# Patient Record
Sex: FEMALE | Race: WHITE | Employment: UNEMPLOYED | URBAN - METROPOLITAN AREA
[De-identification: names, ages, dates, MRNs, and addresses within clinical notes are randomized per-mention and may not be internally consistent; named-entity substitution may affect disease eponyms.]

---

## 2023-03-08 PROBLEM — O99.212 OBESITY COMPLICATING PREGNANCY, SECOND TRIMESTER: Status: ACTIVE | Noted: 2023-03-08

## 2023-03-08 PROBLEM — Z87.59 HISTORY OF GESTATIONAL HYPERTENSION: Status: ACTIVE | Noted: 2023-03-08

## 2023-03-08 PROBLEM — Z34.82 MULTIGRAVIDA IN SECOND TRIMESTER: Status: ACTIVE | Noted: 2023-03-08

## 2023-04-03 DIAGNOSIS — Z34.82 MULTIGRAVIDA IN SECOND TRIMESTER: ICD-10-CM

## 2023-04-03 LAB
ABO + RH BLD: NORMAL
BLOOD GROUP ANTIBODIES SERPL: NORMAL
ERYTHROCYTE [DISTWIDTH] IN BLOOD BY AUTOMATED COUNT: 15.6 % (ref 11.9–14.6)
EST. AVERAGE GLUCOSE BLD GHB EST-MCNC: 100 MG/DL
HBA1C MFR BLD: 5.1 % (ref 4.8–5.6)
HCT VFR BLD AUTO: 36.4 % (ref 35.8–46.3)
HGB BLD-MCNC: 11.8 G/DL (ref 11.7–15.4)
MCH RBC QN AUTO: 25.4 PG (ref 26.1–32.9)
MCHC RBC AUTO-ENTMCNC: 32.4 G/DL (ref 31.4–35)
MCV RBC AUTO: 78.4 FL (ref 82–102)
NRBC # BLD: 0 K/UL (ref 0–0.2)
PLATELET # BLD AUTO: 318 K/UL (ref 150–450)
PMV BLD AUTO: 11.4 FL (ref 9.4–12.3)
RBC # BLD AUTO: 4.64 M/UL (ref 4.05–5.2)
WBC # BLD AUTO: 10.1 K/UL (ref 4.3–11.1)

## 2023-04-04 LAB
HBV SURFACE AG SER QL: NONREACTIVE
HCV AB SER QL: NONREACTIVE
HIV 1+2 AB+HIV1 P24 AG SERPL QL IA: NONREACTIVE
HIV 1/2 RESULT COMMENT: NORMAL
RPR SER QL: NONREACTIVE
RUBV IGG SERPL IA-ACNC: 90.5 IU/ML

## 2023-04-05 LAB
HGB A MFR BLD: 97.2 % (ref 96.4–98.8)
HGB A2 MFR BLD COLUMN CHROM: 2.8 % (ref 1.8–3.2)
HGB F MFR BLD: 0 % (ref 0–2)
HGB FRACT BLD-IMP: NORMAL
HGB S MFR BLD: 0 %

## 2023-04-17 ENCOUNTER — ROUTINE PRENATAL (OUTPATIENT)
Dept: OBGYN CLINIC | Age: 23
End: 2023-04-17
Payer: MEDICAID

## 2023-04-17 VITALS — SYSTOLIC BLOOD PRESSURE: 100 MMHG | DIASTOLIC BLOOD PRESSURE: 67 MMHG | HEART RATE: 81 BPM

## 2023-04-17 DIAGNOSIS — Z34.82 MULTIGRAVIDA IN SECOND TRIMESTER: ICD-10-CM

## 2023-04-17 DIAGNOSIS — O99.342 MENTAL DISORDER AFFECTING PREGNANCY IN SECOND TRIMESTER: ICD-10-CM

## 2023-04-17 DIAGNOSIS — Z87.59 HISTORY OF GESTATIONAL HYPERTENSION: ICD-10-CM

## 2023-04-17 DIAGNOSIS — O09.522 HIGH RISK PREGNANCY, MULTIGRAVIDA OF ADVANCED MATERNAL AGE IN SECOND TRIMESTER: ICD-10-CM

## 2023-04-17 DIAGNOSIS — Z3A.22 22 WEEKS GESTATION OF PREGNANCY: ICD-10-CM

## 2023-04-17 DIAGNOSIS — O99.212 OBESITY COMPLICATING PREGNANCY, SECOND TRIMESTER: Primary | ICD-10-CM

## 2023-04-17 PROCEDURE — 96127 BRIEF EMOTIONAL/BEHAV ASSMT: CPT | Performed by: OBSTETRICS & GYNECOLOGY

## 2023-04-17 PROCEDURE — 76811 OB US DETAILED SNGL FETUS: CPT | Performed by: OBSTETRICS & GYNECOLOGY

## 2023-04-17 PROCEDURE — 76825 ECHO EXAM OF FETAL HEART: CPT | Performed by: OBSTETRICS & GYNECOLOGY

## 2023-04-17 PROCEDURE — 93325 DOPPLER ECHO COLOR FLOW MAPG: CPT | Performed by: OBSTETRICS & GYNECOLOGY

## 2023-04-17 PROCEDURE — 99205 OFFICE O/P NEW HI 60 MIN: CPT | Performed by: OBSTETRICS & GYNECOLOGY

## 2023-04-17 ASSESSMENT — PATIENT HEALTH QUESTIONNAIRE - PHQ9
SUM OF ALL RESPONSES TO PHQ QUESTIONS 1-9: 1
SUM OF ALL RESPONSES TO PHQ9 QUESTIONS 1 & 2: 1
2. FEELING DOWN, DEPRESSED OR HOPELESS: 1
1. LITTLE INTEREST OR PLEASURE IN DOING THINGS: 0
SUM OF ALL RESPONSES TO PHQ QUESTIONS 1-9: 1

## 2023-04-17 NOTE — ASSESSMENT & PLAN NOTE
F/U 4-6 weeks growth and repeat echo    Genetic counseling was performed by physician after reviewing patient's genetic history. The patient's Down syndrome age associated risk, as well as, risks of additional aneuploidy and genetic syndromes, are reduced by approximately 50% with a normal anatomy ultrasound. Ultrasound alone does not rule out all abnormalities of genetics and development. Maternal serum screening for aneuploidy was discussed with the patient including first trimester EZEKIEL-A/hCG, second trimester Quad screen (either in isolation or sequential with EZEKIEL-A) as well as non-invasive prenatal testing (NIPT) for aneuploidy from a maternal blood sample. Positive predictive and negative predictive values for these tests were explained, questions answered. Patient understands that these are screening tests that only assesses risk for select abnormalities (trisomies 15, 25, and 21, and sex chromosome abnormalities (NIPT), as well as markers for placental health (EZEKIEL-A) and risk for open neural tube defects (quad)). NIPT is designed for high risk populations, but should be considered by all patients who desire the current best option for screening for applicable genetic abnormalities. Limitations of technology discussed based on maternal age, technical aspects of tests, and maternal BMI reviewed. All questions answered and concerns discussed. Patient elected to proceed with Ultrasound only with no serum screening.

## 2023-04-17 NOTE — ASSESSMENT & PLAN NOTE
Longterm impact of hypertension in pregnancy    Discussed with patient the long-term and recurrence risks associated with hypertensive disorder of pregnancy. With a history of hypertensive disease in pregnancy, patient has increased risk of cardiovascular and cerebrovascular events in the future. A recent metaanalysis by Camilo Peterson al. demonstrated that over a mean follow-up of 14.1 years, the relative risk of developing chronic hypertension in those with a history of preeclampsia was 3.7. In addition, it predisposes to microalbuminuria and long lasting renal disease. Carefully chosen antihypertensives can lower the risk for both kidney and cardiac events among those with hypertension. Patient counseled to let her PCP know about this history. All women with history of hypertensive disorders of pregnancy should maintain a healthy weight, stay active, avoid tobacco use, and be evaluated regularly for cardiovascular disease. Her risk of recurrence (of any Hypertensive Disorder of Pregnancy) is approximately 25-40%. Recommend low dose Aspirin x 2 tablets (162 mg daily) is recommended to be started by 12-16 weeks; some benefit seen with starting up to 28 weeks. Recommend at least 12 months between pregnancies for future children. Preeclampsia. org for information and preeclampsia registry.

## 2023-04-17 NOTE — ASSESSMENT & PLAN NOTE
Obesity in Pregnancy  Preconception BMI ? 30 increases risk for pregnancy complications, including gestational diabetes, poor or accelerated fetal growth, hypertensive disorders of pregnancy, and abnormal labor progression. In addition, there is an increased risk of fetal demise, as well as congenital anomalies including neural tube defects, cardiac malformations, orofacial defects, and limb reduction abnormalities. The risk for stillbirth increases with increasing obesity: class I obesity 1.3 [1.2-1.4], class II obesity 1.4 [1.3-1.6], class III obesity 1.9 [1.3-1.6]) and higher stillbirth risk in  obese women (1.9 [1.7-2.1]) than in  obese women (1.4 [1.3-1.5]). Among women with class III obesity (BMI ?40 kg/m2), the risk for stillbirth increased with advancing gestational age: 27 to 29 weeks, hazard and risk ratios 1.40 and 1.69, respectively; 37 to 39 weeks, hazard and risk ratios 3.20 and 2.95, respectively; and 40 to 42 weeks, hazard and risk ratios 3.30 and 8.95, respectively. · Recommend level II ultrasound for anatomy and fetal echo if prepregnancy BMI >30-35 based on AIUM guidelines. · Consider  testing beginning at 32-36 weeks due to risks of fetal demise, timing dependent on maternal and fetal comorbidities. · Early evaluation of insulin resistance with HgbA1c at initiation of care- if a1c > 5.5, then follow up with either \"2 step\" 1hr GCT/ 3hr GTT OR \"1 step\" 2hr GTT  · Closely monitor blood pressure for development/worsening of hypertensive disorders of pregnancy. · Weight Gain Goal: <15 pounds, it is ok to stay same weight or lose as long as baby growing well  · Dietary choices- low carb fine, avoid extreme keto (goal >50-75gm carb/day); not to use intermittent/prolonged fasting without specific discussion with physician.    · Continue activity/exercise     Patient counseled re need to optimize both maternal and fetal health by remaining active, limiting

## 2023-04-17 NOTE — PROGRESS NOTES
avoid extreme keto (goal >50-75gm carb/day); not to use intermittent/prolonged fasting without specific discussion with physician. Continue activity/exercise     Patient counseled re need to optimize both maternal and fetal health by remaining active, limiting weight gain, and modifying food choices. She understands that if obesity worsens, then will need to meet with anesthesia and as a team determine safest location for delivery. [address]  Guidelines for the Management of Obese Pregnant Patients  Patients with a Pre-pregnancy or First Trimester BMI ? 50 or ?500 pounds OR who have BMI? 39 with comorbidities should be referred for prenatal care and delivery to a group that delivers at a level 3 center [facility], [facility], [facility]). Patients who reach a BMI ? 50 or 500 pounds during pregnancy should be:   Referred to the Anesthesia Pre-Assessment area for consultation. The Anesthesia department will determine if patient needs any further evaluation and can safely receive anesthesia care at [facility].  Anesthesiologist may refer patient for further evaluation including Maternal Echo and/or Obstructive Sleep Apnea evaluation  Referred back to [facility] for consultation if they are not already following patient. Primary OB will then contact [facility] to discuss Anesthesia evaluation and recommendations and decide whether patient can be safely delivered at [facility] or needs transfer to an OB/Gyn group to be delivered at level 3 center. Mood evaluated today- Mood concerns: well; PHQ2 reviewed. Counseling re possibility of peripartum worsening. As mood stable and depression/anxiety well-controlled, will not change medications today.       Addressed normal pregnancy complaints, reassured and offered suggestions for care  Reviewed gestational age precautions and activity goals/limitations  Nutritional counseling as well as specific

## 2023-04-18 NOTE — ASSESSMENT & PLAN NOTE
Anxiety and Depression  The approach to depression and anxiety in pregnancy must look at the whole maternal-child cohort to assess risks and benefits.  depression is associated with an increased risk of multiple poor obstetrical outcomes, including spontaneous , bleeding, operative deliveries, and  birth. In a nationally representative survey in the [place] that identified pregnant women with major depression, only 50 percent received treatment. Untreated disease causes maternal suffering and is associated with poor nutrition, comorbid substance use disorders, poor adherence with prenatal care, postpartum depression, impaired relationships between the mother and her infant and other family members, and an increased risk of suicide. It is important to assess the benefit of previous treatment in order to guide treatment selection.  If psychotherapy is indicated and the patient was successfully treated with a particular psychotherapy prior to pregnancy, the same therapy is used during pregnancy.  Similarly, if pharmacotherapy is indicated and the patient was successfully treated with a particular antidepressant prior to pregnancy, the same drug is used during pregnancy.  The risks of untreated moderate to severe maternal major depression, to both the mother and fetus, often outweigh the risks associated with antidepressants. A national registry study (nearly 1,300,000 births) compared infants who were exposed to SSRIs in early pregnancy (n >10,000) with infants not exposed. After adjusting for potential confounds (eg, maternal age, smoking, and body mass index), the analyses found that the risk of severe congenital malformations was comparable in the two groups. Antidepressant drug doses may need to be increased as the pregnancy progresses, especially during the third trimester.  There is no evidence that tapering or discontinuing antidepressants at term

## 2023-05-16 ENCOUNTER — ROUTINE PRENATAL (OUTPATIENT)
Dept: OBGYN CLINIC | Age: 23
End: 2023-05-16
Payer: MEDICAID

## 2023-05-16 VITALS
HEART RATE: 88 BPM | OXYGEN SATURATION: 97 % | WEIGHT: 272 LBS | SYSTOLIC BLOOD PRESSURE: 122 MMHG | HEIGHT: 63 IN | BODY MASS INDEX: 48.2 KG/M2 | DIASTOLIC BLOOD PRESSURE: 68 MMHG

## 2023-05-16 DIAGNOSIS — O99.213 OBESITY AFFECTING PREGNANCY IN THIRD TRIMESTER: ICD-10-CM

## 2023-05-16 DIAGNOSIS — Z87.59 HISTORY OF GESTATIONAL HYPERTENSION: ICD-10-CM

## 2023-05-16 DIAGNOSIS — Z34.83 MULTIGRAVIDA IN THIRD TRIMESTER: ICD-10-CM

## 2023-05-16 DIAGNOSIS — O99.343 MENTAL DISORDER AFFECTING PREGNANCY IN THIRD TRIMESTER: ICD-10-CM

## 2023-05-16 PROCEDURE — 99213 OFFICE O/P EST LOW 20 MIN: CPT | Performed by: OBSTETRICS & GYNECOLOGY

## 2023-05-16 ASSESSMENT — PATIENT HEALTH QUESTIONNAIRE - PHQ9
1. LITTLE INTEREST OR PLEASURE IN DOING THINGS: 1
SUM OF ALL RESPONSES TO PHQ QUESTIONS 1-9: 2
SUM OF ALL RESPONSES TO PHQ9 QUESTIONS 1 & 2: 2
SUM OF ALL RESPONSES TO PHQ QUESTIONS 1-9: 2
2. FEELING DOWN, DEPRESSED OR HOPELESS: 1
SUM OF ALL RESPONSES TO PHQ QUESTIONS 1-9: 2
SUM OF ALL RESPONSES TO PHQ QUESTIONS 1-9: 2

## 2023-05-16 NOTE — PROGRESS NOTES
Chief Complaint   Patient presents with    Routine Prenatal Visit        This 20 y.o. Delia Martinez at 26w2d with Estimated Date of Delivery: 23 presents for routine prenatal visit. Patient has some complaints today. Pt reports good FM, no LOF, VB, ctx. Pt denies H/A, vision changes, abdom pain, N/V.  (+) intermittent dizziness - pt admits to drinking no water. Encouraged small frequent meals and significant increase in water intake    Vitals:    23 1126   BP: 122/68   Site: Left Upper Arm   Position: Sitting   Pulse: 88   SpO2: 97%   Weight: 272 lb (123.4 kg)   Height: 5' 3\" (1.6 m)        Patient Active Problem List    Diagnosis Date Noted    Multigravida in third trimester 2023     Priority: Medium     Overview Note:     EDC by 16 3/7 week US not C/W LMP    3/8/23: declines NIPT/AFP  2023 at [facility]: Normal anatomy/echo limited by fetal position/MBH, JHONATAN WNL, AC 44%; Declined genetic testing. Assessment & Plan Note:     Educated patient of signs and symptoms of  labor including but not limited to regular uterine contractions every 5-7 minutes for 1 hour, vaginal bleeding or leakage of fluid to seek immediate care.  Obesity affecting pregnancy in third trimester 2023     Priority: Medium     Overview Note:     PLAN: Hgb A1C (wnl, 5.0), early (wnl)/late glucola, serial growth US, anesthesia consult in 3rd trimester and MFM referral    23 [facility]: () 1 hour glucola = 83         Assessment & Plan Note:     noted      Mental disorder affecting pregnancy in third trimester 2023     Priority: Medium     Overview Note:     Hx depression, anxiety. On lexapro and trazadone prepreg  23 [facility]: Reports stable mood on Lexapro and Trazadone.        Assessment & Plan Note:     Stable on current regimen      History of gestational hypertension 2023     Priority: Medium     Overview Note:     PLAN: baby ASA 12-36 weeks, close obs BP throughout preg  23 [facility]:

## 2023-05-16 NOTE — PROGRESS NOTES
Patient comes in today for routine prenatal visit. Patient states that she is still experiencing \"dizzy spells\" 1-2 weeks. Patient confirms she mainly drinks sweet tea and coffee, educated patient the importance of water intake during pregnancy. Denies chest pain and SOB.      Fetal Movement: Yes  Contractions: No  Vaginal Bleeding: No  Leaking Fluid: No  GI/: Yes-patient needs refill on zofran     Vitals:    05/16/23 1126   BP: 122/68   Site: Left Upper Arm   Position: Sitting   Pulse: 88   SpO2: 97%   Weight: 272 lb (123.4 kg)   Height: 5' 3\" (1.6 m)

## 2023-05-16 NOTE — PATIENT INSTRUCTIONS
Please increase the amount of water you are drinking daily  If you develop signs and symptoms of  labor including but not limited to regular uterine contractions every 5-7 minutes for 1 hour, vaginal bleeding or leakage of fluid please contact our office and/or seek immediate care. Thanks for coming to see us today and letting us take care of you!

## 2023-05-30 ENCOUNTER — ROUTINE PRENATAL (OUTPATIENT)
Dept: OBGYN CLINIC | Age: 23
End: 2023-05-30
Payer: MEDICAID

## 2023-05-30 VITALS — SYSTOLIC BLOOD PRESSURE: 118 MMHG | DIASTOLIC BLOOD PRESSURE: 60 MMHG | BODY MASS INDEX: 48.18 KG/M2 | HEIGHT: 63 IN

## 2023-05-30 DIAGNOSIS — Z34.83 MULTIGRAVIDA IN THIRD TRIMESTER: Primary | ICD-10-CM

## 2023-05-30 DIAGNOSIS — O99.343 MENTAL DISORDER AFFECTING PREGNANCY IN THIRD TRIMESTER: ICD-10-CM

## 2023-05-30 DIAGNOSIS — O99.213 OBESITY AFFECTING PREGNANCY IN THIRD TRIMESTER: ICD-10-CM

## 2023-05-30 DIAGNOSIS — Z34.83 MULTIGRAVIDA IN THIRD TRIMESTER: ICD-10-CM

## 2023-05-30 DIAGNOSIS — Z87.59 HISTORY OF GESTATIONAL HYPERTENSION: ICD-10-CM

## 2023-05-30 LAB
BASOPHILS # BLD: 0 K/UL (ref 0–0.2)
BASOPHILS NFR BLD: 0 % (ref 0–2)
DIFFERENTIAL METHOD BLD: ABNORMAL
EOSINOPHIL # BLD: 0.1 K/UL (ref 0–0.8)
EOSINOPHIL NFR BLD: 1 % (ref 0.5–7.8)
ERYTHROCYTE [DISTWIDTH] IN BLOOD BY AUTOMATED COUNT: 15.5 % (ref 11.9–14.6)
GLUCOSE 1 HOUR: 92 MG/DL
HCT VFR BLD AUTO: 34 % (ref 35.8–46.3)
HGB BLD-MCNC: 10.8 G/DL (ref 11.7–15.4)
IMM GRANULOCYTES # BLD AUTO: 0.1 K/UL (ref 0–0.5)
IMM GRANULOCYTES NFR BLD AUTO: 1 % (ref 0–5)
LYMPHOCYTES # BLD: 2.3 K/UL (ref 0.5–4.6)
LYMPHOCYTES NFR BLD: 26 % (ref 13–44)
MCH RBC QN AUTO: 26.1 PG (ref 26.1–32.9)
MCHC RBC AUTO-ENTMCNC: 31.8 G/DL (ref 31.4–35)
MCV RBC AUTO: 82.1 FL (ref 82–102)
MONOCYTES # BLD: 0.7 K/UL (ref 0.1–1.3)
MONOCYTES NFR BLD: 8 % (ref 4–12)
NEUTS SEG # BLD: 5.7 K/UL (ref 1.7–8.2)
NEUTS SEG NFR BLD: 64 % (ref 43–78)
NRBC # BLD: 0 K/UL (ref 0–0.2)
PLATELET # BLD AUTO: 297 K/UL (ref 150–450)
PMV BLD AUTO: 11.6 FL (ref 9.4–12.3)
RBC # BLD AUTO: 4.14 M/UL (ref 4.05–5.2)
WBC # BLD AUTO: 8.9 K/UL (ref 4.3–11.1)

## 2023-05-30 PROCEDURE — 99213 OFFICE O/P EST LOW 20 MIN: CPT | Performed by: OBSTETRICS & GYNECOLOGY

## 2023-05-30 ASSESSMENT — PATIENT HEALTH QUESTIONNAIRE - PHQ9
SUM OF ALL RESPONSES TO PHQ QUESTIONS 1-9: 1
1. LITTLE INTEREST OR PLEASURE IN DOING THINGS: 0
SUM OF ALL RESPONSES TO PHQ9 QUESTIONS 1 & 2: 1
2. FEELING DOWN, DEPRESSED OR HOPELESS: 1
SUM OF ALL RESPONSES TO PHQ QUESTIONS 1-9: 1

## 2023-05-30 NOTE — PROGRESS NOTES
Patient comes in today for routine prenatal visit. No complaints/concerns today.      Finished Glucola @ 11:12  am    Fetal Movement: Yes  Contractions: No  Vaginal Bleeding: No  Leaking Fluid: No  GI/: No    Vitals:    05/30/23 1101   BP: 118/60   Site: Left Upper Arm   Position: Sitting   Height: 5' 3\" (1.6 m)

## 2023-05-30 NOTE — ASSESSMENT & PLAN NOTE
BP wnl again today  Educated patient on signs and symptoms of preeclampsia including but not limited to: elevated blood pressure, increased swelling, persistent headache, visual changes, nausea/vomiting & right upper quadrant pain. Patient to notify us and/or seek immediate care. Cooperative/Alert/Awake

## 2023-05-30 NOTE — PROGRESS NOTES
Chief Complaint   Patient presents with    Routine Prenatal Visit        This 20 y.o. Jose Miguel Felder at 28w2d with Estimated Date of Delivery: 23 presents for routine prenatal visit. Patient has no complaints today. Pt reports good FM, no LOF, VB, ctx. Pt denies H/A, vision changes, abdom pain, N/V. Vitals:    23 1101   BP: 118/60   Site: Left Upper Arm   Position: Sitting   Height: 5' 3\" (1.6 m)        Patient Active Problem List    Diagnosis Date Noted    Multigravida in third trimester 2023     Priority: Medium     Overview Note:     EDC by 16 3/7 week US not C/W LMP    3/8/23: declines NIPT/AFP  2023 at [facility]: Normal anatomy/echo limited by fetal position/MBH, JHONATAN WNL, AC 44%; Declined genetic testing. Assessment & Plan Note:     Educated patient of signs and symptoms of  labor including but not limited to regular uterine contractions every 5-7 minutes for 1 hour, vaginal bleeding or leakage of fluid to seek immediate care.  Obesity affecting pregnancy in third trimester 2023     Priority: Medium     Overview Note:     PLAN: Hgb A1C (wnl, 5.0), early (wnl)/late glucola, serial growth US, anesthesia consult in 3rd trimester and MFM referral    23 [facility]: () 1 hour glucola = 83         Assessment & Plan Note:     noted      Mental disorder affecting pregnancy in third trimester 2023     Priority: Medium     Overview Note:     Hx depression, anxiety. On lexapro and trazadone prepreg  23 [facility]: Reports stable mood on Lexapro and Trazadone. Assessment & Plan Note:     stable on current regimen      History of gestational hypertension 2023     Priority: Medium     Overview Note:     PLAN: baby ASA 12-36 weeks, close obs BP throughout preg  23 [facility]: 2wk pp had severe HTN. None otherwise.    Get baseline preeclamptic labs in your office at patient's next OB visit (CBC, CMP, LDH, Uric Acid); also do P/C ratio (if elevated, need to then

## 2023-05-31 ENCOUNTER — ROUTINE PRENATAL (OUTPATIENT)
Dept: OBGYN CLINIC | Age: 23
End: 2023-05-31

## 2023-05-31 VITALS — DIASTOLIC BLOOD PRESSURE: 56 MMHG | SYSTOLIC BLOOD PRESSURE: 109 MMHG | HEART RATE: 82 BPM

## 2023-05-31 DIAGNOSIS — O99.343 MENTAL DISORDER AFFECTING PREGNANCY IN THIRD TRIMESTER: ICD-10-CM

## 2023-05-31 DIAGNOSIS — O99.013 ANEMIA DURING PREGNANCY IN THIRD TRIMESTER: ICD-10-CM

## 2023-05-31 DIAGNOSIS — Z87.59 HISTORY OF GESTATIONAL HYPERTENSION: ICD-10-CM

## 2023-05-31 DIAGNOSIS — O99.213 OBESITY AFFECTING PREGNANCY IN THIRD TRIMESTER: ICD-10-CM

## 2023-05-31 DIAGNOSIS — Z3A.28 28 WEEKS GESTATION OF PREGNANCY: ICD-10-CM

## 2023-05-31 DIAGNOSIS — M84.475S METATARSAL FRACTURE, PATHOLOGIC, LEFT, SEQUELA: ICD-10-CM

## 2023-05-31 DIAGNOSIS — Z34.83 MULTIGRAVIDA IN THIRD TRIMESTER: Primary | ICD-10-CM

## 2023-05-31 RX ORDER — DOCUSATE SODIUM 100 MG/1
100 CAPSULE, LIQUID FILLED ORAL 2 TIMES DAILY
Qty: 60 CAPSULE | Refills: 5 | Status: SHIPPED | OUTPATIENT
Start: 2023-05-31 | End: 2023-06-30

## 2023-05-31 RX ORDER — FERROUS SULFATE 325(65) MG
325 TABLET ORAL 2 TIMES DAILY
Qty: 60 TABLET | Refills: 5 | Status: SHIPPED | OUTPATIENT
Start: 2023-05-31

## 2023-05-31 ASSESSMENT — PATIENT HEALTH QUESTIONNAIRE - PHQ9
SUM OF ALL RESPONSES TO PHQ QUESTIONS 1-9: 4
2. FEELING DOWN, DEPRESSED OR HOPELESS: 2
SUM OF ALL RESPONSES TO PHQ QUESTIONS 1-9: 4
SUM OF ALL RESPONSES TO PHQ9 QUESTIONS 1 & 2: 4
SUM OF ALL RESPONSES TO PHQ QUESTIONS 1-9: 4
1. LITTLE INTEREST OR PLEASURE IN DOING THINGS: 2
SUM OF ALL RESPONSES TO PHQ QUESTIONS 1-9: 4

## 2023-05-31 NOTE — PROGRESS NOTES
[facility] [place] Follow-up Visit    Nataliia Cormier (: 2000) is a 20 y.o. Charolet Downer at 28w3d with 2023, by Ultrasound. Presents for evaluation of the following chief complaint(s):  High Risk Pregnancy (BMI >45, Anxiety and Depression, & Hx GHTN, H/O PP PreE)     Patient is not working outside of home. Scheduled to see Primary OB (MICHAELO/Arya) on 23. Reports occasional HA's. Has edema in BLE, left foot is in a boot (has fractured left foot). Denies preeclamptic symptoms. Reports good fetal movement. No bleeding, LOF, cramping, ctxs, or vaginal pressure. Reviewed history of prior pregnancy and postpartum events. Pt with PP preE diagnosis. She originally denied this, but I reviewed this in [facility] with her. At that point she realized that she had had this issue. Of note, her brother  soon after this and she never went to follow up visits. Will need to be monitored closely. Interval history since prior appt reviewed and updated as indicated. Review of Systems - per HPI; otherwise unremarkable. Exam:     Vitals:    23 1507   BP: (!) 109/56   Pulse: 82     Recent Labs Reviewed. Please see formal ultrasound report under imaging tab. ASSESSMENT/PLAN:  Patient Active Problem List    Diagnosis Date Noted    Multigravida in third trimester 2023     Priority: High     Overview Note:     EDC by 16 3/7 week US not C/W LMP    3/8/23: declines NIPT/AFP  2023 at [facility]: Normal anatomy/echo limited by fetal position/MBH, JHONATAN WNL, AC 44%; Declined genetic testing. 23 [facility]: Appropriate fetal growth; Normal repeat echo; AC 40%, Overall 47%, JHONATAN 17.1cm, Dopplers WNL. Reassuring fetal status. Assessment & Plan Note:     No f/u with [facility] needed at this time; return PRN. Metatarsal fracture, pathologic, left, sequela 2023     Priority: Medium     Overview Note:     Rolled foot last week; nondisplaced fracture. Can't take NSAIDs.    Gave #20 5mg

## 2023-05-31 NOTE — TELEPHONE ENCOUNTER
Patient informed of results and recommendations. Rx pend to be sent. Patient voiced understanding.  so

## 2023-05-31 NOTE — TELEPHONE ENCOUNTER
----- Message from Jose Luis Landeros MD sent at 5/31/2023  8:19 AM EDT -----  Please notify patient of anemia - needs Fe PO BID added to PNV. Also needs Colace BID. Can take OTC or have Rx.

## 2023-06-01 NOTE — ASSESSMENT & PLAN NOTE
Rolled foot last week; nondisplaced fracture. Can't take NSAIDs. Gave #20 5mg roxicodone, 0 refills. Sees ortho next week.

## 2023-06-14 PROBLEM — J45.20 MILD INTERMITTENT ASTHMA WITHOUT COMPLICATION: Status: ACTIVE | Noted: 2021-04-26

## 2023-06-14 PROBLEM — F41.1 GENERALIZED ANXIETY DISORDER: Status: ACTIVE | Noted: 2021-09-23

## 2023-06-14 PROBLEM — O99.810 ABNORMAL GLUCOSE TOLERANCE TEST IN PREGNANCY, ANTEPARTUM: Status: ACTIVE | Noted: 2021-11-22

## 2023-06-14 PROBLEM — O13.9 GESTATIONAL HYPERTENSION WITHOUT SIGNIFICANT PROTEINURIA, ANTEPARTUM: Status: ACTIVE | Noted: 2021-12-03

## 2023-06-14 PROBLEM — F32.A DEPRESSIVE DISORDER: Status: ACTIVE | Noted: 2023-06-14

## 2023-06-14 PROBLEM — F33.2 MAJOR DEPRESSIVE DISORDER, RECURRENT EPISODE, SEVERE (HCC): Status: ACTIVE | Noted: 2021-04-26

## 2023-06-14 PROBLEM — O23.43 UTI (URINARY TRACT INFECTION) DURING PREGNANCY, THIRD TRIMESTER: Status: ACTIVE | Noted: 2021-04-19

## 2023-06-14 PROBLEM — R51.9 HEADACHE: Status: ACTIVE | Noted: 2021-12-12

## 2023-06-27 ENCOUNTER — ROUTINE PRENATAL (OUTPATIENT)
Dept: OBGYN CLINIC | Age: 23
End: 2023-06-27
Payer: MEDICAID

## 2023-06-27 VITALS
HEIGHT: 63 IN | SYSTOLIC BLOOD PRESSURE: 116 MMHG | DIASTOLIC BLOOD PRESSURE: 68 MMHG | WEIGHT: 270 LBS | BODY MASS INDEX: 47.84 KG/M2

## 2023-06-27 DIAGNOSIS — O99.343 MENTAL DISORDER AFFECTING PREGNANCY IN THIRD TRIMESTER: ICD-10-CM

## 2023-06-27 DIAGNOSIS — Z87.59 HISTORY OF GESTATIONAL HYPERTENSION: ICD-10-CM

## 2023-06-27 DIAGNOSIS — O26.843 SIGNIFICANT DISCREPANCY BETWEEN UTERINE SIZE AND CLINICAL DATES, ANTEPARTUM, THIRD TRIMESTER: Primary | ICD-10-CM

## 2023-06-27 DIAGNOSIS — Z34.83 MULTIGRAVIDA IN THIRD TRIMESTER: ICD-10-CM

## 2023-06-27 DIAGNOSIS — O99.213 OBESITY AFFECTING PREGNANCY IN THIRD TRIMESTER: ICD-10-CM

## 2023-06-27 PROBLEM — J45.20 MILD INTERMITTENT ASTHMA WITHOUT COMPLICATION: Status: RESOLVED | Noted: 2021-04-26 | Resolved: 2023-06-27

## 2023-06-27 PROBLEM — O99.810 ABNORMAL GLUCOSE TOLERANCE TEST IN PREGNANCY, ANTEPARTUM: Status: RESOLVED | Noted: 2021-11-22 | Resolved: 2023-06-27

## 2023-06-27 PROBLEM — F41.1 GENERALIZED ANXIETY DISORDER: Status: RESOLVED | Noted: 2021-09-23 | Resolved: 2023-06-27

## 2023-06-27 PROBLEM — F32.A DEPRESSIVE DISORDER: Status: RESOLVED | Noted: 2023-06-14 | Resolved: 2023-06-27

## 2023-06-27 PROBLEM — O13.9 GESTATIONAL HYPERTENSION WITHOUT SIGNIFICANT PROTEINURIA, ANTEPARTUM: Status: RESOLVED | Noted: 2021-12-03 | Resolved: 2023-06-27

## 2023-06-27 PROBLEM — O23.43 UTI (URINARY TRACT INFECTION) DURING PREGNANCY, THIRD TRIMESTER: Status: RESOLVED | Noted: 2021-04-19 | Resolved: 2023-06-27

## 2023-06-27 PROCEDURE — 99213 OFFICE O/P EST LOW 20 MIN: CPT | Performed by: NURSE PRACTITIONER

## 2023-06-27 PROCEDURE — 76816 OB US FOLLOW-UP PER FETUS: CPT | Performed by: NURSE PRACTITIONER

## 2023-06-27 ASSESSMENT — PATIENT HEALTH QUESTIONNAIRE - PHQ9
SUM OF ALL RESPONSES TO PHQ QUESTIONS 1-9: 3
4. FEELING TIRED OR HAVING LITTLE ENERGY: 1
2. FEELING DOWN, DEPRESSED OR HOPELESS: 0
5. POOR APPETITE OR OVEREATING: 0
6. FEELING BAD ABOUT YOURSELF - OR THAT YOU ARE A FAILURE OR HAVE LET YOURSELF OR YOUR FAMILY DOWN: 0
10. IF YOU CHECKED OFF ANY PROBLEMS, HOW DIFFICULT HAVE THESE PROBLEMS MADE IT FOR YOU TO DO YOUR WORK, TAKE CARE OF THINGS AT HOME, OR GET ALONG WITH OTHER PEOPLE: 1
8. MOVING OR SPEAKING SO SLOWLY THAT OTHER PEOPLE COULD HAVE NOTICED. OR THE OPPOSITE, BEING SO FIGETY OR RESTLESS THAT YOU HAVE BEEN MOVING AROUND A LOT MORE THAN USUAL: 0
1. LITTLE INTEREST OR PLEASURE IN DOING THINGS: 1
3. TROUBLE FALLING OR STAYING ASLEEP: 1
SUM OF ALL RESPONSES TO PHQ QUESTIONS 1-9: 3
SUM OF ALL RESPONSES TO PHQ QUESTIONS 1-9: 3
7. TROUBLE CONCENTRATING ON THINGS, SUCH AS READING THE NEWSPAPER OR WATCHING TELEVISION: 0
9. THOUGHTS THAT YOU WOULD BE BETTER OFF DEAD, OR OF HURTING YOURSELF: 0
SUM OF ALL RESPONSES TO PHQ9 QUESTIONS 1 & 2: 1
SUM OF ALL RESPONSES TO PHQ QUESTIONS 1-9: 3

## 2023-07-05 ENCOUNTER — HOSPITAL ENCOUNTER (OUTPATIENT)
Age: 23
Discharge: HOME | End: 2023-07-05
Attending: OBSTETRICS & GYNECOLOGY | Admitting: OBSTETRICS & GYNECOLOGY
Payer: MEDICAID

## 2023-07-05 ENCOUNTER — TELEPHONE (OUTPATIENT)
Dept: FAMILY MEDICINE CLINIC | Facility: CLINIC | Age: 23
End: 2023-07-05

## 2023-07-05 VITALS — BODY MASS INDEX: 47.84 KG/M2 | WEIGHT: 270 LBS | HEIGHT: 63 IN

## 2023-07-05 PROCEDURE — 99282 EMERGENCY DEPT VISIT SF MDM: CPT

## 2023-07-05 NOTE — TELEPHONE ENCOUNTER
Patient contacted. Reports getting her toddler out of the pool yesterday when she felt severe pain in back when left her non-mobile for about 5 minutes. Still having back pain and spasms today. Has tried muscle rub without relief. Reports contacted OBGYN since she is pregnant and they do not think symptoms/pain are not harmful to baby. Advised patient to be seen at [facility] or ER due to provider being out of the office until Monday. Reports having appointment scheduled with UC for later this afternoon.

## 2023-07-05 NOTE — H&P
Obstetrics History & Physical/OB ED note    Name: Flynn Rosas MRN: [id number]     YOB: 2000  Age: 20 y.o. Sex: female      Reason for Presentation:  low back pain    HPI: Flynn Rosas is a 20 y.o.  female with Estimated Date of Delivery: 23 at 33w3d gestation. Her current obstetrical history is significant for one previous full-term vaginal delivery, complicated by preeclampsia . Prenatal records reviewed. Feels like she pulled a muscle in her back earlier today lifting her son out of the swimming pool and just wanted to make sure everything was  okay. She reports good fetal movement. She denies vaginal bleeding. She denies leakage of fluid. She denies contractions. Past History:  OB History    Para Term  AB Living   3 1 1   1 1   SAB IAB Ectopic Molar Multiple Live Births   1         1      # Outcome Date GA Lbr Roosevelt/2nd Weight Sex Delivery Anes PTL Lv   3 Current            2 Term 21 40w1d / 00:02 6 lb 9.2 oz (2.983 kg) M Vag-Spont EPI N EL   1 SAB      SAB        Past Medical History:   Diagnosis Date    Acne 10/24/2012    Allergic rhinitis 2013    Anemia during pregnancy in third trimester 2023    Asthma     Depressive disorder 2023    Drug effect     Generalized anxiety disorder 2021    Migraine     Postpartum depression     Trauma      Past Surgical History:   Procedure Laterality Date    CHOLECYSTECTOMY      HERNIA REPAIR      TYMPANOSTOMY TUBE PLACEMENT      UMBILICAL HERNIA REPAIR       Social History     Tobacco Use    Smoking status: Former     Packs/day: 0.25     Years: 5.00     Pack years: 1.25     Types: Cigarettes     Quit date:      Years since quittin.5    Smokeless tobacco: Never   Substance Use Topics    Alcohol use: Never     Prior to Admission medications    Medication Sig Start Date End Date Taking?  Authorizing Provider   ondansetron (ZOFRAN) 4 MG tablet Take 1 tablet by mouth every 8 hours as needed for

## 2023-07-12 ENCOUNTER — ROUTINE PRENATAL (OUTPATIENT)
Dept: OBGYN CLINIC | Age: 23
End: 2023-07-12
Payer: MEDICAID

## 2023-07-12 ENCOUNTER — TELEPHONE (OUTPATIENT)
Dept: OBGYN CLINIC | Age: 23
End: 2023-07-12

## 2023-07-12 VITALS
HEIGHT: 63 IN | WEIGHT: 273 LBS | DIASTOLIC BLOOD PRESSURE: 74 MMHG | BODY MASS INDEX: 48.37 KG/M2 | SYSTOLIC BLOOD PRESSURE: 120 MMHG

## 2023-07-12 DIAGNOSIS — O99.213 OBESITY AFFECTING PREGNANCY IN THIRD TRIMESTER: ICD-10-CM

## 2023-07-12 DIAGNOSIS — Z87.59 HISTORY OF GESTATIONAL HYPERTENSION: ICD-10-CM

## 2023-07-12 DIAGNOSIS — O99.343 MENTAL DISORDER AFFECTING PREGNANCY IN THIRD TRIMESTER: ICD-10-CM

## 2023-07-12 DIAGNOSIS — O99.013 ANEMIA DURING PREGNANCY IN THIRD TRIMESTER: ICD-10-CM

## 2023-07-12 DIAGNOSIS — Z34.83 MULTIGRAVIDA IN THIRD TRIMESTER: ICD-10-CM

## 2023-07-12 PROCEDURE — 99213 OFFICE O/P EST LOW 20 MIN: CPT | Performed by: NURSE PRACTITIONER

## 2023-07-12 ASSESSMENT — PATIENT HEALTH QUESTIONNAIRE - PHQ9
SUM OF ALL RESPONSES TO PHQ QUESTIONS 1-9: 3
10. IF YOU CHECKED OFF ANY PROBLEMS, HOW DIFFICULT HAVE THESE PROBLEMS MADE IT FOR YOU TO DO YOUR WORK, TAKE CARE OF THINGS AT HOME, OR GET ALONG WITH OTHER PEOPLE: 1
5. POOR APPETITE OR OVEREATING: 0
1. LITTLE INTEREST OR PLEASURE IN DOING THINGS: 1
2. FEELING DOWN, DEPRESSED OR HOPELESS: 0
6. FEELING BAD ABOUT YOURSELF - OR THAT YOU ARE A FAILURE OR HAVE LET YOURSELF OR YOUR FAMILY DOWN: 0
SUM OF ALL RESPONSES TO PHQ QUESTIONS 1-9: 3
7. TROUBLE CONCENTRATING ON THINGS, SUCH AS READING THE NEWSPAPER OR WATCHING TELEVISION: 0
9. THOUGHTS THAT YOU WOULD BE BETTER OFF DEAD, OR OF HURTING YOURSELF: 0
8. MOVING OR SPEAKING SO SLOWLY THAT OTHER PEOPLE COULD HAVE NOTICED. OR THE OPPOSITE, BEING SO FIGETY OR RESTLESS THAT YOU HAVE BEEN MOVING AROUND A LOT MORE THAN USUAL: 0
3. TROUBLE FALLING OR STAYING ASLEEP: 1
SUM OF ALL RESPONSES TO PHQ9 QUESTIONS 1 & 2: 1
SUM OF ALL RESPONSES TO PHQ QUESTIONS 1-9: 3
4. FEELING TIRED OR HAVING LITTLE ENERGY: 1
SUM OF ALL RESPONSES TO PHQ QUESTIONS 1-9: 3

## 2023-07-12 NOTE — ASSESSMENT & PLAN NOTE
Pt not taking iron, states not covered by insurance.   We review OTC options/prices at [address] [place] [address]

## 2023-07-12 NOTE — PROGRESS NOTES
Patient comes in today for routine prenatal visit. No complaints/concerns today. Patient's insurance stopped covering iron, patient has been increasing foods high in iron. Makenna Silveira recommendations for coupons. Patient states her finances are stretched thin and can't afford the co-pay, when asked patient did not remember what the co-pay was.      Fetal Movement: yes   Contractions: Yes-BH   Vaginal Bleeding: No  Leaking Fluid: No  GI/: Yes-nausea     Vitals:    07/12/23 1128   BP: 120/74   Site: Left Upper Arm   Position: Sitting   Weight: 273 lb (123.8 kg)   Height: 5' 3\" (1.6 m)

## 2023-07-12 NOTE — ASSESSMENT & PLAN NOTE
PTL/labor precautions, [place], and pregnancy warning signs reviewed. Pt advised to call the office at [phone] or go straight to Labor and Delivery at [facility] ([place], [place]) with any of the following concerns vaginal bleeding, leaking of fluid, abril regularly Q 5-7 minutes for over an hour or not feeling the baby move.    RTO 2 weeks OBV, US, GBS, CBC

## 2023-07-12 NOTE — TELEPHONE ENCOUNTER
Called pharmacy, pharmacy confirmed that the  for the iron that was covered by patients insurance was on back-order and not available at this time. Co-pay for iron without insurance is $6.35. Tried to call patient to give her options. No answer, LVM with no details given. Sent [employer] message with options.

## 2023-07-18 RX ORDER — ONDANSETRON 4 MG/1
TABLET, FILM COATED ORAL
Qty: 30 TABLET | Refills: 1 | OUTPATIENT
Start: 2023-07-18

## 2023-07-31 ENCOUNTER — ROUTINE PRENATAL (OUTPATIENT)
Dept: OBGYN CLINIC | Age: 23
End: 2023-07-31
Payer: MEDICAID

## 2023-07-31 ENCOUNTER — TELEPHONE (OUTPATIENT)
Dept: OBGYN CLINIC | Age: 23
End: 2023-07-31

## 2023-07-31 VITALS
BODY MASS INDEX: 49.43 KG/M2 | WEIGHT: 279 LBS | SYSTOLIC BLOOD PRESSURE: 118 MMHG | DIASTOLIC BLOOD PRESSURE: 70 MMHG | HEIGHT: 63 IN

## 2023-07-31 DIAGNOSIS — Z87.59 HISTORY OF GESTATIONAL HYPERTENSION: ICD-10-CM

## 2023-07-31 DIAGNOSIS — O99.343 MENTAL DISORDER AFFECTING PREGNANCY IN THIRD TRIMESTER: ICD-10-CM

## 2023-07-31 DIAGNOSIS — O99.013 ANEMIA DURING PREGNANCY IN THIRD TRIMESTER: ICD-10-CM

## 2023-07-31 DIAGNOSIS — O99.213 OBESITY AFFECTING PREGNANCY IN THIRD TRIMESTER: ICD-10-CM

## 2023-07-31 DIAGNOSIS — O26.843 SIGNIFICANT DISCREPANCY BETWEEN UTERINE SIZE AND CLINICAL DATES, ANTEPARTUM, THIRD TRIMESTER: Primary | ICD-10-CM

## 2023-07-31 DIAGNOSIS — Z34.83 MULTIGRAVIDA IN THIRD TRIMESTER: ICD-10-CM

## 2023-07-31 DIAGNOSIS — M84.475S METATARSAL FRACTURE, PATHOLOGIC, LEFT, SEQUELA: ICD-10-CM

## 2023-07-31 LAB
ERYTHROCYTE [DISTWIDTH] IN BLOOD BY AUTOMATED COUNT: 15.2 % (ref 11.9–14.6)
HCT VFR BLD AUTO: 33.8 % (ref 35.8–46.3)
HGB BLD-MCNC: 10.6 G/DL (ref 11.7–15.4)
MCH RBC QN AUTO: 25.2 PG (ref 26.1–32.9)
MCHC RBC AUTO-ENTMCNC: 31.4 G/DL (ref 31.4–35)
MCV RBC AUTO: 80.3 FL (ref 82–102)
NRBC # BLD: 0 K/UL (ref 0–0.2)
PLATELET # BLD AUTO: 353 K/UL (ref 150–450)
PMV BLD AUTO: 10.6 FL (ref 9.4–12.3)
RBC # BLD AUTO: 4.21 M/UL (ref 4.05–5.2)
WBC # BLD AUTO: 10.7 K/UL (ref 4.3–11.1)

## 2023-07-31 PROCEDURE — 76816 OB US FOLLOW-UP PER FETUS: CPT | Performed by: NURSE PRACTITIONER

## 2023-07-31 PROCEDURE — 99213 OFFICE O/P EST LOW 20 MIN: CPT | Performed by: NURSE PRACTITIONER

## 2023-07-31 NOTE — ASSESSMENT & PLAN NOTE
PTL/labor precautions, [place], and pregnancy warning signs reviewed. Pt advised to call the office at [phone] or go straight to Labor and Delivery at [facility] with any of the following concerns vaginal bleeding, leaking of fluid, abril regularly Q 5-7 minutes for over an hour or not feeling the baby move.    RTO 1 wk OBV, US (BPP)

## 2023-07-31 NOTE — TELEPHONE ENCOUNTER
Patient LM stating she has some questions about her pregnancy. She stated she was just seen today. Attempted to contact patient to discuss her concerns. PAXTONVM for patient to call the office. No other details given.  so

## 2023-08-02 ENCOUNTER — HOSPITAL ENCOUNTER (OUTPATIENT)
Age: 23
Discharge: HOME | End: 2023-08-02
Attending: OBSTETRICS & GYNECOLOGY | Admitting: OBSTETRICS & GYNECOLOGY
Payer: MEDICAID

## 2023-08-02 VITALS
RESPIRATION RATE: 16 BRPM | SYSTOLIC BLOOD PRESSURE: 116 MMHG | WEIGHT: 279 LBS | TEMPERATURE: 98.4 F | HEART RATE: 93 BPM | HEIGHT: 63 IN | BODY MASS INDEX: 49.43 KG/M2 | DIASTOLIC BLOOD PRESSURE: 56 MMHG | OXYGEN SATURATION: 99 %

## 2023-08-02 PROCEDURE — 99283 EMERGENCY DEPT VISIT LOW MDM: CPT

## 2023-08-02 PROCEDURE — 59025 FETAL NON-STRESS TEST: CPT

## 2023-08-02 NOTE — H&P
History & Physical    Name: Chari Almanzar MRN: [id number]  SSN: [id number]    YOB: 2000  Age: 20 y.o. Sex: female      Subjective:     Reason for Triage visit:  37w3d and decreased fetal movement    History of Present Illness: Ms. Anna Spring is a 20 y.o.  with an estimated gestational age of 44w1d with Estimated Date of Delivery: 23. Patient reports dereased fetal movement tonight. No contractions, vaginal bleeding, or leakage of fluid. Patient denies abdominal pain  , chest pain, contractions, fever, headache , nausea and vomiting, pelvic pressure, right upper quadrant pain  , shortness of breath, swelling, vaginal bleeding , vaginal leaking of fluid , and visual disturbances.     OB History    Para Term  AB Living   3 1 1   1 1   SAB IAB Ectopic Molar Multiple Live Births   1         1      # Outcome Date GA Lbr Roosevelt/2nd Weight Sex Delivery Anes PTL Lv   3 Current            2 Term 21 40w1d / 00:02 6 lb 9.2 oz (2.983 kg) M Vag-Spont EPI N EL   1 SAB      SAB        Past Medical History:   Diagnosis Date    Acne 10/24/2012    Allergic rhinitis 2013    Anemia during pregnancy in third trimester 2023    Asthma     Depressive disorder 2023    Drug effect     Generalized anxiety disorder 2021    Migraine     Postpartum depression     Trauma      Past Surgical History:   Procedure Laterality Date    CHOLECYSTECTOMY      HERNIA REPAIR      TYMPANOSTOMY TUBE PLACEMENT      UMBILICAL HERNIA REPAIR       Social History     Occupational History    Not on file   Tobacco Use    Smoking status: Former     Packs/day: 0.25     Years: 5.00     Pack years: 1.25     Types: Cigarettes     Quit date:      Years since quittin.5    Smokeless tobacco: Never   Vaping Use    Vaping Use: Never used   Substance and Sexual Activity    Alcohol use: Never    Drug use: Never    Sexual activity: Yes     Partners: Male      Family History   Problem

## 2023-08-04 LAB
BACTERIA SPEC CULT: NORMAL
SERVICE CMNT-IMP: NORMAL

## 2023-08-07 ENCOUNTER — ROUTINE PRENATAL (OUTPATIENT)
Dept: OBGYN CLINIC | Age: 23
End: 2023-08-07

## 2023-08-07 VITALS
WEIGHT: 281 LBS | SYSTOLIC BLOOD PRESSURE: 110 MMHG | BODY MASS INDEX: 49.79 KG/M2 | HEIGHT: 63 IN | DIASTOLIC BLOOD PRESSURE: 60 MMHG

## 2023-08-07 DIAGNOSIS — O99.013 ANEMIA DURING PREGNANCY IN THIRD TRIMESTER: ICD-10-CM

## 2023-08-07 DIAGNOSIS — Z34.83 MULTIGRAVIDA IN THIRD TRIMESTER: ICD-10-CM

## 2023-08-07 DIAGNOSIS — O99.213 OBESITY AFFECTING PREGNANCY IN THIRD TRIMESTER: ICD-10-CM

## 2023-08-07 DIAGNOSIS — O26.843 SIGNIFICANT DISCREPANCY BETWEEN UTERINE SIZE AND CLINICAL DATES, ANTEPARTUM, THIRD TRIMESTER: Primary | ICD-10-CM

## 2023-08-07 DIAGNOSIS — Z87.59 HISTORY OF GESTATIONAL HYPERTENSION: ICD-10-CM

## 2023-08-07 DIAGNOSIS — O99.343 MENTAL DISORDER AFFECTING PREGNANCY IN THIRD TRIMESTER: ICD-10-CM

## 2023-08-07 DIAGNOSIS — M84.475S METATARSAL FRACTURE, PATHOLOGIC, LEFT, SEQUELA: ICD-10-CM

## 2023-08-07 NOTE — ASSESSMENT & PLAN NOTE
PTL/labor precautions, [place], and pregnancy warning signs reviewed. Pt advised to call the office at [phone] or go straight to Labor and Delivery at [facility] with any of the following concerns vaginal bleeding, leaking of fluid, abril regularly Q 5-7 minutes for over an hour or not feeling the baby move.    RTO 1 wk

## 2023-08-14 ENCOUNTER — ROUTINE PRENATAL (OUTPATIENT)
Dept: OBGYN CLINIC | Age: 23
End: 2023-08-14
Payer: MEDICAID

## 2023-08-14 VITALS
WEIGHT: 279 LBS | HEIGHT: 63 IN | SYSTOLIC BLOOD PRESSURE: 112 MMHG | BODY MASS INDEX: 49.43 KG/M2 | DIASTOLIC BLOOD PRESSURE: 70 MMHG

## 2023-08-14 DIAGNOSIS — M84.475S METATARSAL FRACTURE, PATHOLOGIC, LEFT, SEQUELA: ICD-10-CM

## 2023-08-14 DIAGNOSIS — F33.2 SEVERE EPISODE OF RECURRENT MAJOR DEPRESSIVE DISORDER, WITHOUT PSYCHOTIC FEATURES (HCC): ICD-10-CM

## 2023-08-14 DIAGNOSIS — O99.013 ANEMIA DURING PREGNANCY IN THIRD TRIMESTER: ICD-10-CM

## 2023-08-14 DIAGNOSIS — Z34.83 MULTIGRAVIDA IN THIRD TRIMESTER: ICD-10-CM

## 2023-08-14 DIAGNOSIS — O99.213 OBESITY AFFECTING PREGNANCY IN THIRD TRIMESTER: ICD-10-CM

## 2023-08-14 DIAGNOSIS — Z87.59 HISTORY OF GESTATIONAL HYPERTENSION: Primary | ICD-10-CM

## 2023-08-14 DIAGNOSIS — O99.343 MENTAL DISORDER AFFECTING PREGNANCY IN THIRD TRIMESTER: ICD-10-CM

## 2023-08-14 PROCEDURE — 76819 FETAL BIOPHYS PROFIL W/O NST: CPT | Performed by: OBSTETRICS & GYNECOLOGY

## 2023-08-14 PROCEDURE — 76820 UMBILICAL ARTERY ECHO: CPT | Performed by: OBSTETRICS & GYNECOLOGY

## 2023-08-14 PROCEDURE — 99213 OFFICE O/P EST LOW 20 MIN: CPT | Performed by: OBSTETRICS & GYNECOLOGY

## 2023-08-14 RX ORDER — DOCUSATE SODIUM 100 MG/1
100 CAPSULE, LIQUID FILLED ORAL 2 TIMES DAILY
COMMUNITY
Start: 2023-07-02

## 2023-08-14 NOTE — ASSESSMENT & PLAN NOTE
Educated patient of signs and symptoms of labor including but not limited to regular uterine contractions every 5-7 minutes for 1 hour, vaginal bleeding or leakage of fluid to seek immediate care. D/W pt at length induction vs spontaneous labor risks and benefits including but not limited to: favorable cervix, Medellin's score, elective/prophylactic vs medical induction, possible increased risk of longer latent stage, possible increased risk of FHT's abnormalities, tachysystole, possible increased risk of , placental abruption, uterine rupture, varying methods of cervical ripening and induction (cytotec, cervical balloon, cervidil and pitocin)  Also D/W that with elective/prophylactic inductions, having her induction postponed for medically indicated inductions is always a possibility. Pt understands, accepts all known and unknown risks and DECLINES IOL.

## 2023-08-14 NOTE — PROGRESS NOTES
Patient comes in today for routine prenatal visit. No complaints/concerns today.      Fetal Movement: Yes  Contractions: No  Vaginal Bleeding: No  Leaking Fluid: No  GI/: No    Vitals:    08/14/23 0924   BP: 112/70   Site: Left Upper Arm   Position: Sitting   Weight: 279 lb (126.6 kg)   Height: 5' 3\" (1.6 m)
PROFILE W/O NON STRESS TESTING (Completed)    US DOPPLER FETAL UMBILICAL ARTERY (Completed)    Metatarsal fracture, pathologic, left, sequela    Anemia during pregnancy in third trimester     noted         Relevant Orders    AMB POC US FETAL BIOPHYSICAL PROFILE W/O NON STRESS TESTING (Completed)    US DOPPLER FETAL UMBILICAL ARTERY (Completed)       Other    History of gestational hypertension - Primary     BP wnl again today         Relevant Orders    AMB POC US FETAL BIOPHYSICAL PROFILE W/O NON STRESS TESTING (Completed)    US DOPPLER FETAL UMBILICAL ARTERY (Completed)    Major depressive disorder, recurrent episode, severe (HCC)        Madalyn Felton MD     9:50 AM    08/14/23

## 2023-08-14 NOTE — PATIENT INSTRUCTIONS
If you develop signs and symptoms of labor including but not limited to regular uterine contractions every 5-7 minutes for 1 hour, vaginal bleeding or leakage of fluid please contact our office and/or seek immediate care. Thanks for coming to see us today and letting us take care of you!

## 2023-08-23 ENCOUNTER — ROUTINE PRENATAL (OUTPATIENT)
Dept: OBGYN CLINIC | Age: 23
End: 2023-08-23

## 2023-08-23 VITALS
HEIGHT: 63 IN | WEIGHT: 282 LBS | DIASTOLIC BLOOD PRESSURE: 76 MMHG | SYSTOLIC BLOOD PRESSURE: 124 MMHG | BODY MASS INDEX: 49.96 KG/M2

## 2023-08-23 DIAGNOSIS — Z87.59 HISTORY OF GESTATIONAL HYPERTENSION: ICD-10-CM

## 2023-08-23 DIAGNOSIS — Z34.83 MULTIGRAVIDA IN THIRD TRIMESTER: ICD-10-CM

## 2023-08-23 DIAGNOSIS — O99.013 ANEMIA DURING PREGNANCY IN THIRD TRIMESTER: ICD-10-CM

## 2023-08-23 DIAGNOSIS — M84.475S METATARSAL FRACTURE, PATHOLOGIC, LEFT, SEQUELA: ICD-10-CM

## 2023-08-23 DIAGNOSIS — O99.213 OBESITY AFFECTING PREGNANCY IN THIRD TRIMESTER: ICD-10-CM

## 2023-08-23 DIAGNOSIS — O48.0 POST-TERM PREGNANCY, 40-42 WEEKS OF GESTATION: Primary | ICD-10-CM

## 2023-08-23 DIAGNOSIS — O99.343 MENTAL DISORDER AFFECTING PREGNANCY IN THIRD TRIMESTER: ICD-10-CM

## 2023-08-23 ASSESSMENT — PATIENT HEALTH QUESTIONNAIRE - PHQ9: DEPRESSION UNABLE TO ASSESS: FUNCTIONAL CAPACITY MOTIVATION LIMITS ACCURACY

## 2023-08-28 ENCOUNTER — HOSPITAL ENCOUNTER (INPATIENT)
Age: 23
LOS: 3 days | Discharge: HOME | End: 2023-08-31
Attending: OBSTETRICS & GYNECOLOGY | Admitting: OBSTETRICS & GYNECOLOGY
Payer: MEDICAID

## 2023-08-28 ENCOUNTER — ANESTHESIA (OUTPATIENT)
Dept: LABOR AND DELIVERY | Age: 23
End: 2023-08-28
Payer: MEDICAID

## 2023-08-28 ENCOUNTER — ROUTINE PRENATAL (OUTPATIENT)
Dept: OBGYN CLINIC | Age: 23
End: 2023-08-28
Payer: MEDICAID

## 2023-08-28 ENCOUNTER — ANESTHESIA EVENT (OUTPATIENT)
Dept: LABOR AND DELIVERY | Age: 23
End: 2023-08-28
Payer: MEDICAID

## 2023-08-28 VITALS
SYSTOLIC BLOOD PRESSURE: 116 MMHG | HEIGHT: 63 IN | WEIGHT: 284 LBS | DIASTOLIC BLOOD PRESSURE: 68 MMHG | BODY MASS INDEX: 50.32 KG/M2

## 2023-08-28 DIAGNOSIS — O99.013 ANEMIA DURING PREGNANCY IN THIRD TRIMESTER: ICD-10-CM

## 2023-08-28 DIAGNOSIS — O99.213 OBESITY AFFECTING PREGNANCY IN THIRD TRIMESTER: ICD-10-CM

## 2023-08-28 DIAGNOSIS — O48.0 POST-TERM PREGNANCY, 40-42 WEEKS OF GESTATION: Primary | ICD-10-CM

## 2023-08-28 DIAGNOSIS — Z34.83 MULTIGRAVIDA IN THIRD TRIMESTER: ICD-10-CM

## 2023-08-28 DIAGNOSIS — O99.343 MENTAL DISORDER AFFECTING PREGNANCY IN THIRD TRIMESTER: ICD-10-CM

## 2023-08-28 DIAGNOSIS — I26.93 SINGLE SUBSEGMENTAL PULMONARY EMBOLISM WITHOUT ACUTE COR PULMONALE (HCC): Primary | ICD-10-CM

## 2023-08-28 DIAGNOSIS — Z87.59 HISTORY OF GESTATIONAL HYPERTENSION: ICD-10-CM

## 2023-08-28 PROBLEM — Z34.90 ENCOUNTER FOR INDUCTION OF LABOR: Status: ACTIVE | Noted: 2023-08-28

## 2023-08-28 PROBLEM — Z3A.41 41 WEEKS GESTATION OF PREGNANCY: Status: ACTIVE | Noted: 2023-08-28

## 2023-08-28 PROBLEM — R51.9 HEADACHE: Status: RESOLVED | Noted: 2021-12-12 | Resolved: 2023-08-28

## 2023-08-28 PROBLEM — Z37.9 NORMAL LABOR: Status: ACTIVE | Noted: 2023-08-28

## 2023-08-28 LAB
ABO + RH BLD: NORMAL
BASOPHILS # BLD: 0 K/UL (ref 0–0.2)
BASOPHILS NFR BLD: 0 % (ref 0–2)
BLOOD GROUP ANTIBODIES SERPL: NORMAL
DIFFERENTIAL METHOD BLD: ABNORMAL
EOSINOPHIL # BLD: 0.2 K/UL (ref 0–0.8)
EOSINOPHIL NFR BLD: 1 % (ref 0.5–7.8)
ERYTHROCYTE [DISTWIDTH] IN BLOOD BY AUTOMATED COUNT: 17.2 % (ref 11.9–14.6)
HCT VFR BLD AUTO: 34.5 % (ref 35.8–46.3)
HGB BLD-MCNC: 11.1 G/DL (ref 11.7–15.4)
IMM GRANULOCYTES # BLD AUTO: 0.1 K/UL (ref 0–0.5)
IMM GRANULOCYTES NFR BLD AUTO: 1 % (ref 0–5)
LYMPHOCYTES # BLD: 2.4 K/UL (ref 0.5–4.6)
LYMPHOCYTES NFR BLD: 20 % (ref 13–44)
MCH RBC QN AUTO: 25.2 PG (ref 26.1–32.9)
MCHC RBC AUTO-ENTMCNC: 32.2 G/DL (ref 31.4–35)
MCV RBC AUTO: 78.2 FL (ref 82–102)
MONOCYTES # BLD: 0.9 K/UL (ref 0.1–1.3)
MONOCYTES NFR BLD: 7 % (ref 4–12)
NEUTS SEG # BLD: 8.5 K/UL (ref 1.7–8.2)
NEUTS SEG NFR BLD: 70 % (ref 43–78)
NRBC # BLD: 0 K/UL (ref 0–0.2)
PLATELET # BLD AUTO: 318 K/UL (ref 150–450)
PMV BLD AUTO: 10.4 FL (ref 9.4–12.3)
RBC # BLD AUTO: 4.41 M/UL (ref 4.05–5.2)
SPECIMEN EXP DATE BLD: NORMAL
WBC # BLD AUTO: 12.1 K/UL (ref 4.3–11.1)

## 2023-08-28 PROCEDURE — 7220000101 HC DELIVERY VAGINAL/SINGLE

## 2023-08-28 PROCEDURE — 59409 OBSTETRICAL CARE: CPT | Performed by: OBSTETRICS & GYNECOLOGY

## 2023-08-28 PROCEDURE — 6360000002 HC RX W HCPCS

## 2023-08-28 PROCEDURE — 1100000000 HC RM PRIVATE

## 2023-08-28 PROCEDURE — 7210000100 HC LABOR FEE PER 1 HR

## 2023-08-28 PROCEDURE — 86850 RBC ANTIBODY SCREEN: CPT

## 2023-08-28 PROCEDURE — 2580000003 HC RX 258: Performed by: OBSTETRICS & GYNECOLOGY

## 2023-08-28 PROCEDURE — 0HQ9XZZ REPAIR PERINEUM SKIN, EXTERNAL APPROACH: ICD-10-PCS | Performed by: OBSTETRICS & GYNECOLOGY

## 2023-08-28 PROCEDURE — 86900 BLOOD TYPING SEROLOGIC ABO: CPT

## 2023-08-28 PROCEDURE — 85025 COMPLETE CBC W/AUTO DIFF WBC: CPT

## 2023-08-28 PROCEDURE — 86901 BLOOD TYPING SEROLOGIC RH(D): CPT

## 2023-08-28 PROCEDURE — 99213 OFFICE O/P EST LOW 20 MIN: CPT | Performed by: OBSTETRICS & GYNECOLOGY

## 2023-08-28 PROCEDURE — 76820 UMBILICAL ARTERY ECHO: CPT | Performed by: OBSTETRICS & GYNECOLOGY

## 2023-08-28 PROCEDURE — 6360000002 HC RX W HCPCS: Performed by: NURSE ANESTHETIST, CERTIFIED REGISTERED

## 2023-08-28 PROCEDURE — 76819 FETAL BIOPHYS PROFIL W/O NST: CPT | Performed by: OBSTETRICS & GYNECOLOGY

## 2023-08-28 PROCEDURE — 00HU33Z INSERTION OF INFUSION DEVICE INTO SPINAL CANAL, PERCUTANEOUS APPROACH: ICD-10-PCS | Performed by: OBSTETRICS & GYNECOLOGY

## 2023-08-28 RX ORDER — ONDANSETRON 2 MG/ML
4 INJECTION INTRAMUSCULAR; INTRAVENOUS EVERY 6 HOURS PRN
Status: DISCONTINUED | OUTPATIENT
Start: 2023-08-28 | End: 2023-08-29

## 2023-08-28 RX ORDER — TRANEXAMIC ACID 10 MG/ML
1000 INJECTION, SOLUTION INTRAVENOUS
Status: DISCONTINUED | OUTPATIENT
Start: 2023-08-28 | End: 2023-08-28 | Stop reason: SDUPTHER

## 2023-08-28 RX ORDER — METHYLERGONOVINE MALEATE 0.2 MG/ML
200 INJECTION INTRAVENOUS PRN
Status: DISCONTINUED | OUTPATIENT
Start: 2023-08-28 | End: 2023-08-28 | Stop reason: SDUPTHER

## 2023-08-28 RX ORDER — ONDANSETRON 2 MG/ML
4 INJECTION INTRAMUSCULAR; INTRAVENOUS EVERY 6 HOURS PRN
Status: DISCONTINUED | OUTPATIENT
Start: 2023-08-28 | End: 2023-08-28 | Stop reason: SDUPTHER

## 2023-08-28 RX ORDER — MISOPROSTOL 200 UG/1
800 TABLET ORAL PRN
Status: DISCONTINUED | OUTPATIENT
Start: 2023-08-28 | End: 2023-08-28 | Stop reason: SDUPTHER

## 2023-08-28 RX ORDER — SODIUM CHLORIDE 9 MG/ML
25 INJECTION, SOLUTION INTRAVENOUS PRN
Status: DISCONTINUED | OUTPATIENT
Start: 2023-08-28 | End: 2023-08-28 | Stop reason: SDUPTHER

## 2023-08-28 RX ORDER — FENTANYL CITRATE 50 UG/ML
INJECTION, SOLUTION INTRAMUSCULAR; INTRAVENOUS PRN
Status: DISCONTINUED | OUTPATIENT
Start: 2023-08-28 | End: 2023-08-28 | Stop reason: SDUPTHER

## 2023-08-28 RX ORDER — MISOPROSTOL 200 UG/1
800 TABLET ORAL PRN
Status: DISCONTINUED | OUTPATIENT
Start: 2023-08-28 | End: 2023-08-29

## 2023-08-28 RX ORDER — TRANEXAMIC ACID 10 MG/ML
1000 INJECTION, SOLUTION INTRAVENOUS
Status: DISCONTINUED | OUTPATIENT
Start: 2023-08-28 | End: 2023-08-29

## 2023-08-28 RX ORDER — FENTANYL CITRATE 50 UG/ML
INJECTION, SOLUTION INTRAMUSCULAR; INTRAVENOUS
Status: COMPLETED
Start: 2023-08-28 | End: 2023-08-28

## 2023-08-28 RX ORDER — CARBOPROST TROMETHAMINE 250 UG/ML
250 INJECTION, SOLUTION INTRAMUSCULAR PRN
Status: DISCONTINUED | OUTPATIENT
Start: 2023-08-28 | End: 2023-08-28 | Stop reason: SDUPTHER

## 2023-08-28 RX ORDER — SODIUM CHLORIDE 9 MG/ML
25 INJECTION, SOLUTION INTRAVENOUS PRN
Status: DISCONTINUED | OUTPATIENT
Start: 2023-08-28 | End: 2023-08-29

## 2023-08-28 RX ORDER — CARBOPROST TROMETHAMINE 250 UG/ML
250 INJECTION, SOLUTION INTRAMUSCULAR PRN
Status: DISCONTINUED | OUTPATIENT
Start: 2023-08-28 | End: 2023-08-29

## 2023-08-28 RX ORDER — SODIUM CHLORIDE 0.9 % (FLUSH) 0.9 %
5-40 SYRINGE (ML) INJECTION PRN
Status: DISCONTINUED | OUTPATIENT
Start: 2023-08-28 | End: 2023-08-29

## 2023-08-28 RX ORDER — DOCUSATE SODIUM 100 MG/1
100 CAPSULE, LIQUID FILLED ORAL 2 TIMES DAILY
Status: DISCONTINUED | OUTPATIENT
Start: 2023-08-28 | End: 2023-08-29

## 2023-08-28 RX ORDER — ROPIVACAINE HYDROCHLORIDE 2 MG/ML
INJECTION, SOLUTION EPIDURAL; INFILTRATION; PERINEURAL CONTINUOUS PRN
Status: DISCONTINUED | OUTPATIENT
Start: 2023-08-28 | End: 2023-08-28 | Stop reason: SDUPTHER

## 2023-08-28 RX ORDER — SODIUM CHLORIDE 0.9 % (FLUSH) 0.9 %
5-40 SYRINGE (ML) INJECTION EVERY 12 HOURS SCHEDULED
Status: DISCONTINUED | OUTPATIENT
Start: 2023-08-28 | End: 2023-08-29

## 2023-08-28 RX ORDER — TERBUTALINE SULFATE 1 MG/ML
0.25 INJECTION, SOLUTION SUBCUTANEOUS ONCE
OUTPATIENT
Start: 2023-08-28 | End: 2023-08-28

## 2023-08-28 RX ORDER — SODIUM CHLORIDE, SODIUM LACTATE, POTASSIUM CHLORIDE, CALCIUM CHLORIDE 600; 310; 30; 20 MG/100ML; MG/100ML; MG/100ML; MG/100ML
INJECTION, SOLUTION INTRAVENOUS CONTINUOUS
Status: DISCONTINUED | OUTPATIENT
Start: 2023-08-28 | End: 2023-08-29

## 2023-08-28 RX ORDER — SODIUM CHLORIDE, SODIUM LACTATE, POTASSIUM CHLORIDE, AND CALCIUM CHLORIDE .6; .31; .03; .02 G/100ML; G/100ML; G/100ML; G/100ML
1000 INJECTION, SOLUTION INTRAVENOUS PRN
Status: DISCONTINUED | OUTPATIENT
Start: 2023-08-28 | End: 2023-08-28 | Stop reason: SDUPTHER

## 2023-08-28 RX ORDER — SODIUM CHLORIDE, SODIUM LACTATE, POTASSIUM CHLORIDE, AND CALCIUM CHLORIDE .6; .31; .03; .02 G/100ML; G/100ML; G/100ML; G/100ML
500 INJECTION, SOLUTION INTRAVENOUS PRN
Status: DISCONTINUED | OUTPATIENT
Start: 2023-08-28 | End: 2023-08-29

## 2023-08-28 RX ORDER — SODIUM CHLORIDE, SODIUM LACTATE, POTASSIUM CHLORIDE, AND CALCIUM CHLORIDE .6; .31; .03; .02 G/100ML; G/100ML; G/100ML; G/100ML
1000 INJECTION, SOLUTION INTRAVENOUS PRN
Status: DISCONTINUED | OUTPATIENT
Start: 2023-08-28 | End: 2023-08-29

## 2023-08-28 RX ORDER — SODIUM CHLORIDE, SODIUM LACTATE, POTASSIUM CHLORIDE, CALCIUM CHLORIDE 600; 310; 30; 20 MG/100ML; MG/100ML; MG/100ML; MG/100ML
INJECTION, SOLUTION INTRAVENOUS CONTINUOUS
Status: DISCONTINUED | OUTPATIENT
Start: 2023-08-28 | End: 2023-08-28 | Stop reason: SDUPTHER

## 2023-08-28 RX ORDER — SODIUM CHLORIDE, SODIUM LACTATE, POTASSIUM CHLORIDE, AND CALCIUM CHLORIDE .6; .31; .03; .02 G/100ML; G/100ML; G/100ML; G/100ML
500 INJECTION, SOLUTION INTRAVENOUS PRN
Status: DISCONTINUED | OUTPATIENT
Start: 2023-08-28 | End: 2023-08-28 | Stop reason: SDUPTHER

## 2023-08-28 RX ORDER — ACETAMINOPHEN 325 MG/1
650 TABLET ORAL EVERY 4 HOURS PRN
Status: DISCONTINUED | OUTPATIENT
Start: 2023-08-28 | End: 2023-08-29

## 2023-08-28 RX ORDER — METHYLERGONOVINE MALEATE 0.2 MG/ML
200 INJECTION INTRAVENOUS PRN
Status: DISCONTINUED | OUTPATIENT
Start: 2023-08-28 | End: 2023-08-29

## 2023-08-28 RX ADMIN — SODIUM CHLORIDE, POTASSIUM CHLORIDE, SODIUM LACTATE AND CALCIUM CHLORIDE 1000 ML: 600; 310; 30; 20 INJECTION, SOLUTION INTRAVENOUS at 22:40

## 2023-08-28 RX ADMIN — Medication 87.3 MILLI-UNITS/MIN: at 23:29

## 2023-08-28 RX ADMIN — FENTANYL CITRATE 100 MCG: 50 INJECTION, SOLUTION INTRAMUSCULAR; INTRAVENOUS at 23:08

## 2023-08-28 RX ADMIN — SODIUM CHLORIDE, POTASSIUM CHLORIDE, SODIUM LACTATE AND CALCIUM CHLORIDE 125 ML/HR: 600; 310; 30; 20 INJECTION, SOLUTION INTRAVENOUS at 22:54

## 2023-08-28 RX ADMIN — ROPIVACAINE HYDROCHLORIDE 8 ML/HR: 2 INJECTION, SOLUTION EPIDURAL; INFILTRATION; PERINEURAL at 22:56

## 2023-08-28 ASSESSMENT — PATIENT HEALTH QUESTIONNAIRE - PHQ9
SUM OF ALL RESPONSES TO PHQ QUESTIONS 1-9: 4
8. MOVING OR SPEAKING SO SLOWLY THAT OTHER PEOPLE COULD HAVE NOTICED. OR THE OPPOSITE, BEING SO FIGETY OR RESTLESS THAT YOU HAVE BEEN MOVING AROUND A LOT MORE THAN USUAL: 0
SUM OF ALL RESPONSES TO PHQ QUESTIONS 1-9: 4
5. POOR APPETITE OR OVEREATING: 0
1. LITTLE INTEREST OR PLEASURE IN DOING THINGS: 1
10. IF YOU CHECKED OFF ANY PROBLEMS, HOW DIFFICULT HAVE THESE PROBLEMS MADE IT FOR YOU TO DO YOUR WORK, TAKE CARE OF THINGS AT HOME, OR GET ALONG WITH OTHER PEOPLE: 1
9. THOUGHTS THAT YOU WOULD BE BETTER OFF DEAD, OR OF HURTING YOURSELF: 0
3. TROUBLE FALLING OR STAYING ASLEEP: 1
4. FEELING TIRED OR HAVING LITTLE ENERGY: 1
SUM OF ALL RESPONSES TO PHQ QUESTIONS 1-9: 4
SUM OF ALL RESPONSES TO PHQ QUESTIONS 1-9: 4
6. FEELING BAD ABOUT YOURSELF - OR THAT YOU ARE A FAILURE OR HAVE LET YOURSELF OR YOUR FAMILY DOWN: 0
SUM OF ALL RESPONSES TO PHQ9 QUESTIONS 1 & 2: 2
2. FEELING DOWN, DEPRESSED OR HOPELESS: 1
7. TROUBLE CONCENTRATING ON THINGS, SUCH AS READING THE NEWSPAPER OR WATCHING TELEVISION: 0

## 2023-08-28 NOTE — H&P
History & Physical    Name: Chucky Samuel MRN: [id number]  SSN: [id number]    YOB: 2000  Age: 20 y.o. Sex: female      Subjective:     Reason for Triage visit:  41w1d and contractions    History of Present Illness: Ms. Dexter Orozco is a 20 y.o.  female with an estimated gestational age of 44w3d with Estimated Date of Delivery: 23. Patient states that she has been abril every 4-5 minutes. On arrival SROM, clear fluid. Cervix exam 3-/-2  Pregnancy has been  complicated by gestational HTN, obesity, depression. Patient denies chest pain, fever, headache , nausea and vomiting, pelvic pressure, right upper quadrant pain  , shortness of breath, swelling, vaginal bleeding , and visual disturbances.     OB History    Para Term  AB Living   3 1 1   1 1   SAB IAB Ectopic Molar Multiple Live Births   1         1      # Outcome Date GA Lbr [place]/2nd Weight Sex Delivery Anes PTL Lv   3 Current            2 Term 21 40w1d / 00:02 6 lb 9.2 oz (2.983 kg) M Vag-Spont EPI N EL   1 SAB      SAB        Past Medical History:   Diagnosis Date    Acne 10/24/2012    Allergic rhinitis 2013    Anemia during pregnancy in third trimester 2023    Asthma     Depressive disorder 2023    Drug effect     Generalized anxiety disorder 2021    Migraine     Postpartum depression     Trauma      Past Surgical History:   Procedure Laterality Date    CHOLECYSTECTOMY      HERNIA REPAIR      TYMPANOSTOMY TUBE PLACEMENT      UMBILICAL HERNIA REPAIR       Social History     Occupational History    Not on file   Tobacco Use    Smoking status: Former     Packs/day: 0.25     Years: 5.00     Pack years: 1.25     Types: Cigarettes     Quit date:      Years since quittin.6    Smokeless tobacco: Never   Vaping Use    Vaping Use: Never used   Substance and Sexual Activity    Alcohol use: Never    Drug use: Never    Sexual activity: Yes     Partners: Male      Family History

## 2023-08-28 NOTE — PATIENT INSTRUCTIONS
Please call Labor and Delivery ([phone]) tomorrow morning at 5:00 am and they will tell you when to be there. I will see you later that morning! If you develop signs and symptoms of labor including but not limited to regular uterine contractions every 5-7 minutes for 1 hour, vaginal bleeding or leakage of fluid please contact our office and/or seek immediate care. Thanks for coming to see us today and letting us take care of you!

## 2023-08-29 PROCEDURE — 7100000010 HC PHASE II RECOVERY - FIRST 15 MIN

## 2023-08-29 PROCEDURE — 7100000011 HC PHASE II RECOVERY - ADDTL 15 MIN

## 2023-08-29 PROCEDURE — 6370000000 HC RX 637 (ALT 250 FOR IP): Performed by: OBSTETRICS & GYNECOLOGY

## 2023-08-29 PROCEDURE — 3700000025 EPIDURAL BLOCK: Performed by: ANESTHESIOLOGY

## 2023-08-29 PROCEDURE — 1100000000 HC RM PRIVATE

## 2023-08-29 RX ORDER — SODIUM CHLORIDE 0.9 % (FLUSH) 0.9 %
5-40 SYRINGE (ML) INJECTION PRN
Status: DISCONTINUED | OUTPATIENT
Start: 2023-08-29 | End: 2023-08-31 | Stop reason: HOSPADM

## 2023-08-29 RX ORDER — SODIUM CHLORIDE 0.9 % (FLUSH) 0.9 %
5-40 SYRINGE (ML) INJECTION EVERY 12 HOURS SCHEDULED
Status: DISCONTINUED | OUTPATIENT
Start: 2023-08-29 | End: 2023-08-31 | Stop reason: HOSPADM

## 2023-08-29 RX ORDER — MORPHINE SULFATE 4 MG/ML
4 INJECTION, SOLUTION INTRAMUSCULAR; INTRAVENOUS
Status: DISCONTINUED | OUTPATIENT
Start: 2023-08-29 | End: 2023-08-31 | Stop reason: HOSPADM

## 2023-08-29 RX ORDER — DIPHENHYDRAMINE HCL 25 MG
25 CAPSULE ORAL ONCE
Status: COMPLETED | OUTPATIENT
Start: 2023-08-29 | End: 2023-08-29

## 2023-08-29 RX ORDER — IBUPROFEN 800 MG/1
800 TABLET ORAL EVERY 6 HOURS PRN
Status: DISCONTINUED | OUTPATIENT
Start: 2023-08-29 | End: 2023-08-31 | Stop reason: HOSPADM

## 2023-08-29 RX ORDER — METHYLERGONOVINE MALEATE 0.2 MG/ML
200 INJECTION INTRAVENOUS PRN
Status: DISCONTINUED | OUTPATIENT
Start: 2023-08-29 | End: 2023-08-31 | Stop reason: HOSPADM

## 2023-08-29 RX ORDER — ESCITALOPRAM OXALATE 10 MG/1
20 TABLET ORAL DAILY
Status: DISCONTINUED | OUTPATIENT
Start: 2023-08-29 | End: 2023-08-31 | Stop reason: HOSPADM

## 2023-08-29 RX ORDER — DOCUSATE SODIUM 100 MG/1
100 CAPSULE, LIQUID FILLED ORAL 2 TIMES DAILY
Status: DISCONTINUED | OUTPATIENT
Start: 2023-08-29 | End: 2023-08-31 | Stop reason: HOSPADM

## 2023-08-29 RX ORDER — MORPHINE SULFATE 4 MG/ML
2 INJECTION, SOLUTION INTRAMUSCULAR; INTRAVENOUS
Status: DISCONTINUED | OUTPATIENT
Start: 2023-08-29 | End: 2023-08-31 | Stop reason: HOSPADM

## 2023-08-29 RX ORDER — SODIUM CHLORIDE, SODIUM LACTATE, POTASSIUM CHLORIDE, CALCIUM CHLORIDE 600; 310; 30; 20 MG/100ML; MG/100ML; MG/100ML; MG/100ML
INJECTION, SOLUTION INTRAVENOUS CONTINUOUS
Status: DISCONTINUED | OUTPATIENT
Start: 2023-08-29 | End: 2023-08-31 | Stop reason: HOSPADM

## 2023-08-29 RX ORDER — OXYCODONE HYDROCHLORIDE 5 MG/1
10 TABLET ORAL EVERY 4 HOURS PRN
Status: DISCONTINUED | OUTPATIENT
Start: 2023-08-29 | End: 2023-08-31 | Stop reason: HOSPADM

## 2023-08-29 RX ORDER — ACETAMINOPHEN 500 MG
1000 TABLET ORAL EVERY 8 HOURS PRN
Status: DISCONTINUED | OUTPATIENT
Start: 2023-08-29 | End: 2023-08-31 | Stop reason: HOSPADM

## 2023-08-29 RX ORDER — SODIUM CHLORIDE 9 MG/ML
INJECTION, SOLUTION INTRAVENOUS PRN
Status: DISCONTINUED | OUTPATIENT
Start: 2023-08-29 | End: 2023-08-31 | Stop reason: HOSPADM

## 2023-08-29 RX ORDER — ONDANSETRON 8 MG/1
8 TABLET, ORALLY DISINTEGRATING ORAL EVERY 8 HOURS PRN
Status: DISCONTINUED | OUTPATIENT
Start: 2023-08-29 | End: 2023-08-31 | Stop reason: HOSPADM

## 2023-08-29 RX ORDER — LANOLIN
CREAM (ML) TOPICAL PRN
Status: DISCONTINUED | OUTPATIENT
Start: 2023-08-29 | End: 2023-08-31 | Stop reason: HOSPADM

## 2023-08-29 RX ORDER — OXYCODONE HYDROCHLORIDE 5 MG/1
5 TABLET ORAL EVERY 4 HOURS PRN
Status: DISCONTINUED | OUTPATIENT
Start: 2023-08-29 | End: 2023-08-31 | Stop reason: HOSPADM

## 2023-08-29 RX ORDER — TRANEXAMIC ACID 10 MG/ML
1000 INJECTION, SOLUTION INTRAVENOUS
Status: ACTIVE | OUTPATIENT
Start: 2023-08-29 | End: 2023-08-30

## 2023-08-29 RX ADMIN — DIPHENHYDRAMINE HYDROCHLORIDE 25 MG: 25 CAPSULE ORAL at 03:15

## 2023-08-29 RX ADMIN — IBUPROFEN 800 MG: 800 TABLET, FILM COATED ORAL at 13:14

## 2023-08-29 RX ADMIN — DOCUSATE SODIUM 100 MG: 100 CAPSULE, LIQUID FILLED ORAL at 13:14

## 2023-08-29 RX ADMIN — ESCITALOPRAM OXALATE 20 MG: 10 TABLET ORAL at 13:14

## 2023-08-29 RX ADMIN — OXYCODONE HYDROCHLORIDE 5 MG: 5 TABLET ORAL at 16:57

## 2023-08-29 RX ADMIN — DOCUSATE SODIUM 100 MG: 100 CAPSULE, LIQUID FILLED ORAL at 21:43

## 2023-08-29 NOTE — LACTATION NOTE
This note was copied from a baby's chart. Lactation visit. 2nd time mom, tried briefly pumping with first but unsuccessful she said, had low milk supply. This baby has not latched well yet. Early in first 24 hours of life. RN started mom pumping earlier today, syringe fed colostrum. Baby showing cues now and mom asked for help. Assisted on right breast. Everted nipples. Did football hold. Baby very fussy, hard to soothe, no latch. Syringe fed a few ml's off finger and baby did calm but fussy immediately again after, no latch. Reassured mom that baby may take some time to learn to latch well. Continue with latch attempts each feeding. If baby does not latch well, need to pump x 15 minutes. Assisted mom to pump. Mom pumped ~5ml. Saved for next feeding. Questions answered.

## 2023-08-29 NOTE — PROGRESS NOTES
Dr. Janay Anglin called per pt request. Pt expresses itching from the waste down and states \"I feel like I am having an allergic reaction\". New order received for benadryl 25 mg po once. Telephone with read back.

## 2023-08-29 NOTE — LACTATION NOTE

## 2023-08-29 NOTE — PLAN OF CARE
Problem: Safety - Adult  Goal: Free from fall injury  Outcome: Progressing     Problem: Postpartum  Goal: Experiences normal postpartum course  Description:  Postpartum OB-Pregnancy care plan goal which identifies if the mother is experiencing a normal postpartum course  Outcome: Progressing  Goal: Appropriate maternal -  bonding  Description:  Postpartum OB-Pregnancy care plan goal which identifies if the mother and  are bonding appropriately  Outcome: Progressing  Goal: Establishment of infant feeding pattern  Description:  Postpartum OB-Pregnancy care plan goal which identifies if the mother is establishing a feeding pattern with their   Outcome: Progressing  Goal: Incisions, wounds, or drain sites healing without S/S of infection  Outcome: Progressing     Problem: Pain  Goal: Verbalizes/displays adequate comfort level or baseline comfort level  Outcome: Progressing     Problem: Infection - Adult  Goal: Absence of infection at discharge  Outcome: Progressing  Goal: Absence of infection during hospitalization  Outcome: Progressing     Problem: Discharge Planning  Goal: Discharge to home or other facility with appropriate resources  Outcome: Progressing     Problem: Chronic Conditions and Co-morbidities  Goal: Patient's chronic conditions and co-morbidity symptoms are monitored and maintained or improved  Outcome: Progressing

## 2023-08-29 NOTE — L&D DELIVERY NOTE
Darryl Dhaliwal, Girl Tay Casarez [[id number]]      Labor Events     Labor: No   Steroids: None  Cervical Ripening Date/Time:      Antibiotics Received during Labor: No  Rupture Identifier: Sac 1  Rupture Date/Time:  23 18:15:00   Rupture Type: SROM  Fluid Color: Clear  Fluid Odor: None  Fluid Volume:  Moderate  Augmentation: None  Labor Complications: None              Anesthesia    Method: Epidural       Labor Event Times      Labor onset date/time:  23      Dilation complete date/time:        Start pushing date/time:     Decision date/time (emergent ):            Delivery Details      Delivery Date: 23 Delivery Time: 23:25:00   Delivery Type: Vaginal, Spontaneous              [place] Presentation    Presentation: Vertex  Position: Left  _: Occiput  _: Anterior       Shoulder Dystocia    Shoulder Dystocia Present?: No       Assisted Delivery Details    Forceps Attempted?: No  Vacuum Extractor Attempted?: No                           Cord    Vessels: 3 Vessels  Complications: None  Delayed Cord Clamping?: Yes  Cord Blood Disposition: Lab  Gases Sent?: No              Placenta    Date/Time: 2023 23:29:43  Removal: Spontaneous  Appearance: Intact  Disposition: Discarded       Lacerations    Episiotomy: None  Perineal Lacerations: 1st  Other Lacerations: no non-perineal laceration  Number of Repair Packets: 0       Blood Loss  Mother: Maxi Blackwood #[id number]     Start of Mother's Information      Delivery Blood Loss  23 1125 - 23 2340      Quantitative Blood Loss (mL) Hospital Encounter 200 grams    Total  200 mL               End of Mother's Information  Mother: Maxi Blackwood #[id number]                Delivery Providers    Delivering clinician: Barb Evans MD     Provider Role    Jennie Langford MD Obstetrician    Polina Henriquez, ELLIOTT Primary Nurse     Primary  Nurse    Evan Hernandez Scrub Tech    Lesvia Nayak RN Charge Nurse  Assessment    Living Status: Living  Delivery Location Comment: 430        Skin Color:   Heart Rate:   Reflex Irritability:   Muscle Tone:   Respiratory Effort: Total:            1 Minute:         5 Minute:                                                 Resuscitation    Method: Bulb Suction, Room Air, Stimulation              Measurements                   I was occupied in another patients room doing a delivery with repair when this pt was complete and needing to push. Dr Eryn Curry hospitalist present for delivery-- I arrived at bedside at 10 minutes after delivery.  of a VFI at 2325 on 23  Apgars 9, 9    C/C/+2-->pushed to deliver head YONAS onto intact perineum. Body followed easily thereafter. Delayed cord clamping, baby to mom. Cord clamped/cut. Cord blood drawn. Placenta delivered S/I/3VC. Small 1st degree lac noted--hemostatic w/out repair. FF w/ pit and massage. QBL per RN. Mom/baby stable.             Deep Dorantes MD

## 2023-08-29 NOTE — ANESTHESIA PROCEDURE NOTES
Epidural Block    Patient location during procedure: OB  Start time: 8/28/2023 10:35 PM  End time: 8/28/2023 10:56 PM  Reason for block: labor epidural  Staffing  Performed: anesthesiologist   Anesthesiologist: Barbara Key MD  Epidural  Patient position: sitting  Prep: ChloraPrep  Patient monitoring: continuous pulse ox and frequent blood pressure checks  Approach: midline  Location: L3-4  Injection technique: ARIES air  Provider prep: mask and sterile gloves  Needle  Needle type: Tuohy   Needle gauge: 17 G  Needle length: 3.5 in  Needle insertion depth: 8 cm  Catheter type: end hole  Catheter size: 19 G  Catheter at skin depth: 13 cm  Test dose: negative (Negative test dose with 5 mL 1.5% lidocaine with 1:200,000 epinephrine at 2251)Catheter Secured: tegaderm and tape  Assessment  Sensory level: T10  Hemodynamics: stable  Attempts: 1  Outcomes: uncomplicated  Additional Notes  Procedure Time Out at 2241  Preanesthetic Checklist  Completed: patient identified, IV checked, risks and benefits discussed, surgical/procedural consents, equipment checked, pre-op evaluation, timeout performed, anesthesia consent given, oxygen available and monitors applied/VS acknowledged

## 2023-08-30 ENCOUNTER — APPOINTMENT (OUTPATIENT)
Dept: CT IMAGING | Age: 23
End: 2023-08-30
Payer: MEDICAID

## 2023-08-30 PROBLEM — I26.93 SINGLE SUBSEGMENTAL PULMONARY EMBOLISM WITHOUT ACUTE COR PULMONALE (HCC): Status: ACTIVE | Noted: 2023-08-30

## 2023-08-30 PROBLEM — E66.813 CLASS 3 SEVERE OBESITY IN ADULT: Chronic | Status: ACTIVE | Noted: 2023-08-30

## 2023-08-30 PROBLEM — E66.01 CLASS 3 SEVERE OBESITY IN ADULT (HCC): Chronic | Status: ACTIVE | Noted: 2023-08-30

## 2023-08-30 LAB
ALBUMIN SERPL-MCNC: 2.2 G/DL (ref 3.5–5)
ANION GAP SERPL CALC-SCNC: 8 MMOL/L (ref 2–11)
BUN SERPL-MCNC: 6 MG/DL (ref 6–23)
CALCIUM SERPL-MCNC: 8 MG/DL (ref 8.3–10.4)
CHLORIDE SERPL-SCNC: 113 MMOL/L (ref 101–110)
CO2 SERPL-SCNC: 22 MMOL/L (ref 21–32)
CREAT SERPL-MCNC: 0.72 MG/DL (ref 0.6–1)
D DIMER PPP FEU-MCNC: 0.73 UG/ML(FEU)
EKG ATRIAL RATE: 64 BPM
EKG DIAGNOSIS: NORMAL
EKG P AXIS: 44 DEGREES
EKG P-R INTERVAL: 86 MS
EKG Q-T INTERVAL: 404 MS
EKG QRS DURATION: 88 MS
EKG QTC CALCULATION (BAZETT): 416 MS
EKG R AXIS: 44 DEGREES
EKG T AXIS: 30 DEGREES
EKG VENTRICULAR RATE: 64 BPM
GLUCOSE SERPL-MCNC: 117 MG/DL (ref 65–100)
MAGNESIUM SERPL-MCNC: 1.8 MG/DL (ref 1.8–2.4)
POTASSIUM SERPL-SCNC: 3.4 MMOL/L (ref 3.5–5.1)
SODIUM SERPL-SCNC: 143 MMOL/L (ref 133–143)
TROPONIN I SERPL HS-MCNC: 3.7 PG/ML (ref 0–14)

## 2023-08-30 PROCEDURE — 6360000002 HC RX W HCPCS: Performed by: OBSTETRICS & GYNECOLOGY

## 2023-08-30 PROCEDURE — 83735 ASSAY OF MAGNESIUM: CPT

## 2023-08-30 PROCEDURE — 6360000004 HC RX CONTRAST MEDICATION: Performed by: STUDENT IN AN ORGANIZED HEALTH CARE EDUCATION/TRAINING PROGRAM

## 2023-08-30 PROCEDURE — 6370000000 HC RX 637 (ALT 250 FOR IP): Performed by: OBSTETRICS & GYNECOLOGY

## 2023-08-30 PROCEDURE — 85379 FIBRIN DEGRADATION QUANT: CPT

## 2023-08-30 PROCEDURE — 80048 BASIC METABOLIC PNL TOTAL CA: CPT

## 2023-08-30 PROCEDURE — 93010 ELECTROCARDIOGRAM REPORT: CPT | Performed by: INTERNAL MEDICINE

## 2023-08-30 PROCEDURE — 2580000003 HC RX 258: Performed by: STUDENT IN AN ORGANIZED HEALTH CARE EDUCATION/TRAINING PROGRAM

## 2023-08-30 PROCEDURE — 36415 COLL VENOUS BLD VENIPUNCTURE: CPT

## 2023-08-30 PROCEDURE — 84484 ASSAY OF TROPONIN QUANT: CPT

## 2023-08-30 PROCEDURE — 93005 ELECTROCARDIOGRAM TRACING: CPT | Performed by: OBSTETRICS & GYNECOLOGY

## 2023-08-30 PROCEDURE — 6370000000 HC RX 637 (ALT 250 FOR IP): Performed by: STUDENT IN AN ORGANIZED HEALTH CARE EDUCATION/TRAINING PROGRAM

## 2023-08-30 PROCEDURE — 1100000000 HC RM PRIVATE

## 2023-08-30 PROCEDURE — 82040 ASSAY OF SERUM ALBUMIN: CPT

## 2023-08-30 PROCEDURE — 71260 CT THORAX DX C+: CPT

## 2023-08-30 RX ORDER — IBUPROFEN 800 MG/1
800 TABLET ORAL EVERY 8 HOURS PRN
Qty: 100 TABLET | Refills: 0 | Status: SHIPPED | OUTPATIENT
Start: 2023-08-30 | End: 2023-08-31 | Stop reason: HOSPADM

## 2023-08-30 RX ORDER — LANOLIN ALCOHOL/MO/W.PET/CERES
400 CREAM (GRAM) TOPICAL DAILY
Status: DISCONTINUED | OUTPATIENT
Start: 2023-08-30 | End: 2023-08-31 | Stop reason: HOSPADM

## 2023-08-30 RX ORDER — MAGNESIUM HYDROXIDE/ALUMINUM HYDROXICE/SIMETHICONE 120; 1200; 1200 MG/30ML; MG/30ML; MG/30ML
30 SUSPENSION ORAL EVERY 6 HOURS PRN
Status: DISCONTINUED | OUTPATIENT
Start: 2023-08-30 | End: 2023-08-31 | Stop reason: HOSPADM

## 2023-08-30 RX ORDER — POTASSIUM CHLORIDE 20 MEQ/1
40 TABLET, EXTENDED RELEASE ORAL ONCE
Status: COMPLETED | OUTPATIENT
Start: 2023-08-30 | End: 2023-08-30

## 2023-08-30 RX ORDER — SODIUM CHLORIDE 0.9 % (FLUSH) 0.9 %
10 SYRINGE (ML) INJECTION
Status: COMPLETED | OUTPATIENT
Start: 2023-08-30 | End: 2023-08-30

## 2023-08-30 RX ORDER — POTASSIUM CHLORIDE 20 MEQ/1
40 TABLET, EXTENDED RELEASE ORAL ONCE
Status: DISCONTINUED | OUTPATIENT
Start: 2023-08-30 | End: 2023-08-31 | Stop reason: HOSPADM

## 2023-08-30 RX ORDER — OXYCODONE HYDROCHLORIDE 5 MG/1
5 TABLET ORAL EVERY 8 HOURS PRN
Qty: 16 TABLET | Refills: 0 | Status: SHIPPED | OUTPATIENT
Start: 2023-08-30 | End: 2023-09-06

## 2023-08-30 RX ORDER — 0.9 % SODIUM CHLORIDE 0.9 %
100 INTRAVENOUS SOLUTION INTRAVENOUS
Status: COMPLETED | OUTPATIENT
Start: 2023-08-30 | End: 2023-08-30

## 2023-08-30 RX ORDER — ENOXAPARIN SODIUM 150 MG/ML
1 INJECTION SUBCUTANEOUS EVERY 12 HOURS
Status: DISCONTINUED | OUTPATIENT
Start: 2023-08-30 | End: 2023-08-31 | Stop reason: HOSPADM

## 2023-08-30 RX ADMIN — ESCITALOPRAM OXALATE 20 MG: 10 TABLET ORAL at 09:56

## 2023-08-30 RX ADMIN — Medication 400 MG: at 16:52

## 2023-08-30 RX ADMIN — POTASSIUM CHLORIDE 20 MEQ: 1500 TABLET, EXTENDED RELEASE ORAL at 14:46

## 2023-08-30 RX ADMIN — IOPAMIDOL 100 ML: 755 INJECTION, SOLUTION INTRAVENOUS at 17:17

## 2023-08-30 RX ADMIN — SODIUM CHLORIDE 100 ML: 9 INJECTION, SOLUTION INTRAVENOUS at 17:17

## 2023-08-30 RX ADMIN — OXYCODONE HYDROCHLORIDE 5 MG: 5 TABLET ORAL at 21:25

## 2023-08-30 RX ADMIN — DOCUSATE SODIUM 100 MG: 100 CAPSULE, LIQUID FILLED ORAL at 09:55

## 2023-08-30 RX ADMIN — SODIUM CHLORIDE, PRESERVATIVE FREE 10 ML: 5 INJECTION INTRAVENOUS at 17:17

## 2023-08-30 RX ADMIN — OXYCODONE HYDROCHLORIDE 10 MG: 5 TABLET ORAL at 09:56

## 2023-08-30 RX ADMIN — ENOXAPARIN SODIUM 135 MG: 150 INJECTION SUBCUTANEOUS at 20:26

## 2023-08-30 RX ADMIN — DOCUSATE SODIUM 100 MG: 100 CAPSULE, LIQUID FILLED ORAL at 20:26

## 2023-08-30 RX ADMIN — OXYCODONE HYDROCHLORIDE 10 MG: 5 TABLET ORAL at 14:19

## 2023-08-30 RX ADMIN — IBUPROFEN 800 MG: 800 TABLET, FILM COATED ORAL at 20:26

## 2023-08-30 RX ADMIN — ALUMINUM HYDROXIDE, MAGNESIUM HYDROXIDE, DIMETHICONE 30 ML: 200; 200; 20 LIQUID ORAL at 11:51

## 2023-08-30 ASSESSMENT — PAIN SCALES - GENERAL: PAINLEVEL_OUTOF10: 6

## 2023-08-30 ASSESSMENT — PAIN DESCRIPTION - LOCATION: LOCATION: ABDOMEN;BACK

## 2023-08-30 NOTE — LACTATION NOTE

## 2023-08-30 NOTE — PROGRESS NOTES
1128-pt called out c/o chest pain. Pt states she has pain under her bilateral breast and in the middle of her chest. Pt c/o pain 7/10. Pt states she feels like she is having trouble breathing although RR are 20 regular and unlabored. 1139-Dr. Liz Frias at nurses station and informed of the above. Per MD pt to have EKG, hospitalist consults, Maalox. Orders read back and verified. 1142-Miquel Andrea notified via perfect serv of consult regarding chest pain. 1143-call placed to pharmacy, spoke with Ben regarding the Maalox being sent up to floor. 1144-EKG being performed  1147-Dr. Schmitz given EKG   1151-Maalox given see MAR. Pt in bed with no s/s of distress noted at this time.

## 2023-08-30 NOTE — CARE COORDINATION
Chart reviewed - depression/anxiety/postpartum depression. SW met with patient and  to complete initial assessment. Patient confirms a history of generalized depression and anxiety as well as postpartum depression after the birth of her son (). Per patient, she also experienced the loss of her brother and his fiance soon after she had her son in . Patient is currently taking Lexapro and Trazadone and states that it works \"so so. \"  Per patient, her mood was \"okay\" during pregnancy. Patient, her , and their 1 y/o son live with her parents. Per patient, her parents do not provide support/assistance, so she has a very limited support system. SW educated patient on mental health support available thru [facility]'s [facility] Program ([phone]). Patient agreeable for  to make referral.  She will then be contacted by a [address] [place] [address] to be enrolled in support services. Patient given informational packet on  mood & anxiety disorders (resources/education). Patient was also given list of virtual therapists that accept [facility]. Family denies any additional needs from  at this time. Patient agreeable for  to contact her in several weeks to check-in. Family has 's contact information should any needs/questions arise.     EPHRAIM Andrews, [facility]   [phone]

## 2023-08-30 NOTE — PROGRESS NOTES
Pt taken to radiology dept for CT via w/c by Shahriar Read, RN no s/s of distress noted at this time.

## 2023-08-30 NOTE — PROGRESS NOTES
1845-pili served Dr. George Arellano  to get plan of care for pt nga. 1852-Dr. George Arellano read message  1855-Dr. George alejandre served saying that pt does not need telemetry monitoring at this time but does need an anticoagulant and to contact Dr. Dena Stock  1857-call placed to Dr. Dena Stock left a message for MD to call RN back.

## 2023-08-30 NOTE — CARE COORDINATION
OB notified of EPDS score (14) via e-mail.     EPHRAIM Dennis, [address] [facility]   [phone]

## 2023-08-30 NOTE — PROGRESS NOTES
Shift assessment complete see flowsheet. Discussed today plan of care with pt. Bleeding precaution given. No s/s of distress noted at this time. Pt in bed with call light in reach.

## 2023-08-30 NOTE — LACTATION NOTE
This note was copied from a baby's chart. In to see mom and infant for discharge. Mom wanted assistance w/ latching as time to feed baby. Tried w/ mom x 15 minutes on both sides in different positions. Baby very fussy at breast and will not latch. Encouraged mom to pump after any poor/fair feeds and give back EBM. Mom may need to start supplementing today if milk volume is not sufficent for baby. Gave printed feeding plan for parents to use for guidance at home. They verbalized understanding how to use it. Gave syringes to bring at home if needed to give back EBM and or formula. Avoid using pacifier until baby latching better if possible. Discussed other way to soothe. Monitor output closely as well to see if baby needs more volume and if content after feeds. Reviewed how to manage period of engorgement and discharge instructions. Measured mom's nipples and helped discuss proper flange size. Answered their questions. Mom has 2 pumps at home, declined pump rental. Rn in to see mom for assessment. Mom to pump after and feed back milk right away. Encouraged lactation follow up here or at [address] [place] [address] or [facility] per mom's request for info. No further needs at this time.

## 2023-08-30 NOTE — LACTATION NOTE
This note was copied from a baby's chart. In to see mom as asked about medication she got from contrast, if okay to breast feed. Looked up info and passed onto mom. Should be WNL to continue to feed baby her breast milk. Mom about to feed baby. Offered to assist her w/ latching as desired, mom agreeable. Tried baby on her left breast in football hold. Baby very fussy again and no interest in latching. Set mom up to pump. Her milk is starting to transition to light yellow. Did encourage them again to read through feeding plan given to them earlier today and use for guidance on amount to make sure baby is getting. Reviewed if baby not latching and feeding well and mom pumps and less  mls than feeding plan advised q 3 hrs, should be giving baby some supplementation of formula as well. Parents verbalized understanding. Mom continuing to pump at this time and dad to feed back milk immediately after. No further needs at this time.

## 2023-08-30 NOTE — PROGRESS NOTES
Went to given the Potassium. Pt was able to swallow one of the 20 mEq tablets. Pt then states \"I can't take this one\"   and hands this RN back the tablet. Explained to pt want her K level was and encouraged pt to take tablet. Pt declines the other tablet. Will waste tablet.

## 2023-08-30 NOTE — PROGRESS NOTES
Patient seen and examined in postpartum room due to complaints of severe pain at epidural site. Visible bruise noted at epidural site and patient TTP around entire area. No mass felt. Area not erythematous or fluctuant. Denies weakness/numbness/tingling in legs and bowel/bladder problems. Pain is likely due to soft tissue trauma from Tuohy during epidural insertion. Discussed red flag symptoms with patient and her  and instructed them to call L&D immediately if any of those symptoms arose. Both expressed understanding. All questions answered.       Nazanin Pulido MD  8/30/2023

## 2023-08-30 NOTE — PROGRESS NOTES
CT + for PE  Pharmacy consult placed to dose lovenox  He also rec's following anti-Xa levels and says he will also order those.

## 2023-08-30 NOTE — PROGRESS NOTES
Dr. Thanh Howard at bedside talking with pt regarding her D-Dimer results and that she needs a CT scan.

## 2023-08-30 NOTE — DISCHARGE SUMMARY
Discharge Summary     Date of Admission:  2023  5:57 PM  Date of Discharge:  2023       Erich García 20 y.o. [id number] presented at 41w1d for induction/in active labor. Pt had  without incident. See delivery note for all delivery details. Pt's PP course was uneventful. On day of D/C, she was ambulating well, afebrile, with lochia < menses. She was discharged home with medications as below. Pt was breast feeding on discharge. She plans on unsure for birth control. HTN diagnosis: no - but pt had GHTN with prior pregnancy  Routine PP instructions given to patient. She is to follow up with her ob weeks for BP exam.    No problem-specific Assessment & Plan notes found for this encounter. Medication List        START taking these medications      ibuprofen 800 MG tablet  Commonly known as: ADVIL;MOTRIN  Take 1 tablet by mouth every 8 hours as needed for Pain     oxyCODONE 5 MG immediate release tablet  Commonly known as: ROXICODONE  Take 1 tablet by mouth every 8 hours as needed for Pain for up to 7 days.  Max Daily Amount: 15 mg            CONTINUE taking these medications      docusate sodium 100 MG capsule  Commonly known as: COLACE     escitalopram 20 MG tablet  Commonly known as: LEXAPRO     famotidine 20 MG tablet  Commonly known as: PEPCID  Take 1 tablet by mouth 2 times daily     ondansetron 4 MG tablet  Commonly known as: ZOFRAN  Take 1 tablet by mouth every 8 hours as needed for Nausea or Vomiting     PRENATAL VITAMIN PO            STOP taking these medications      ferrous sulfate 325 (65 Fe) MG tablet  Commonly known as: IRON 325     traZODone 50 MG tablet  Commonly known as: DESYREL               Where to Get Your Medications        These medications were sent to [facility]/pharmacy #[id number]- [facility] [address] [address]      Phone: [phone]   ibuprofen 800 MG tablet  oxyCODONE 5 MG

## 2023-08-30 NOTE — PROGRESS NOTES
Dr. Rahul Lobo called RN back, per MD she is going to consult with Dr. Dali Clement but pt will be going on Lovenox and MD to call back with dosage.

## 2023-08-30 NOTE — PROGRESS NOTES
Dr. Basilio Began notified via phone of CT results Trace pulmonary embolism in a subsegmental artery of the posterior segment of   the right lower lobe. While this RN was on the phone with MD radiology called wanting to speak with Dr. Basilio Began. Not orders have been received at this time.

## 2023-08-30 NOTE — PROGRESS NOTES
Pt resting quietly in bed with eyes closed and respiration even and unlabored.  Call light within reach

## 2023-08-30 NOTE — PROGRESS NOTES
RN called to room. Pt c/o feeling numbness around her mouth and tingling in her left leg. Pt VS WNL. No s/s of distress noted at this time. Breathing is regular and unlabored. This RN inquired about is pt anxious about going home. Pt states she wants to go home but has been anxious about her pending lab results. Pt also has not eaten today. Pt states \"don't call the doctor\". Informed pt that she can't complain about something and not expect the MD to not be called. 1610-demario tray given. 1615-pt states \"I feel better now. My numbness is gone. You were right it was my anxiety and not eating\". Informed pt if symptoms come back to let RN know  1626-Dr. Schmitz notified of the above. No new order received.

## 2023-08-30 NOTE — PROGRESS NOTES
Dr. Darwin Munoz at nurses station, per MD anesthesiologist needs to come assess pt due to c/o back pain. Orders read back and verified. 1040-call placed to Dr. Jocelin Jackson, informed her of the above, MD to come assess pt. No orders received.

## 2023-08-30 NOTE — PROGRESS NOTES
Pt's EPDS is 15  SW seeing pt  I've sent a staff message to her ob team about this, as well as need for bp ck in office next wk

## 2023-08-31 ENCOUNTER — CLINICAL DOCUMENTATION (OUTPATIENT)
Dept: OBGYN CLINIC | Age: 23
End: 2023-08-31

## 2023-08-31 ENCOUNTER — APPOINTMENT (OUTPATIENT)
Dept: NON INVASIVE DIAGNOSTICS | Age: 23
End: 2023-08-31
Payer: MEDICAID

## 2023-08-31 VITALS
WEIGHT: 274 LBS | TEMPERATURE: 98.5 F | BODY MASS INDEX: 48.54 KG/M2 | HEART RATE: 68 BPM | SYSTOLIC BLOOD PRESSURE: 118 MMHG | OXYGEN SATURATION: 97 % | RESPIRATION RATE: 18 BRPM | DIASTOLIC BLOOD PRESSURE: 74 MMHG

## 2023-08-31 LAB
ANION GAP SERPL CALC-SCNC: 6 MMOL/L (ref 2–11)
BUN SERPL-MCNC: 8 MG/DL (ref 6–23)
CALCIUM SERPL-MCNC: 8.1 MG/DL (ref 8.3–10.4)
CHLORIDE SERPL-SCNC: 112 MMOL/L (ref 101–110)
CO2 SERPL-SCNC: 25 MMOL/L (ref 21–32)
CREAT SERPL-MCNC: 0.74 MG/DL (ref 0.6–1)
ECHO AO ASC DIAM: 2.7 CM
ECHO AO ASCENDING AORTA INDEX: 1.21 CM/M2
ECHO AO ROOT DIAM: 2.9 CM
ECHO AO ROOT INDEX: 1.29 CM/M2
ECHO AV AREA PEAK VELOCITY: 2.2 CM2
ECHO AV AREA VTI: 2.3 CM2
ECHO AV AREA/BSA PEAK VELOCITY: 1 CM2/M2
ECHO AV AREA/BSA VTI: 1 CM2/M2
ECHO AV MEAN GRADIENT: 5 MMHG
ECHO AV MEAN VELOCITY: 1 M/S
ECHO AV PEAK GRADIENT: 9 MMHG
ECHO AV PEAK VELOCITY: 1.5 M/S
ECHO AV VELOCITY RATIO: 0.67
ECHO AV VTI: 31.2 CM
ECHO BSA: 2.39 M2
ECHO EST RA PRESSURE: 8 MMHG
ECHO IVC EXP: 2.2 CM
ECHO LA AREA 2C: 18.9 CM2
ECHO LA AREA 4C: 19.9 CM2
ECHO LA DIAMETER INDEX: 1.92 CM/M2
ECHO LA DIAMETER: 4.3 CM
ECHO LA MAJOR AXIS: 5.5 CM
ECHO LA MINOR AXIS: 5.5 CM
ECHO LA TO AORTIC ROOT RATIO: 1.48
ECHO LA VOL 2C: 50 ML (ref 22–52)
ECHO LA VOL 4C: 56 ML (ref 22–52)
ECHO LA VOL BP: 53 ML (ref 22–52)
ECHO LA VOL/BSA BIPLANE: 24 ML/M2 (ref 16–34)
ECHO LA VOLUME INDEX A2C: 22 ML/M2 (ref 16–34)
ECHO LA VOLUME INDEX A4C: 25 ML/M2 (ref 16–34)
ECHO LV E' LATERAL VELOCITY: 15 CM/S
ECHO LV E' SEPTAL VELOCITY: 10 CM/S
ECHO LV EDV A2C: 148 ML
ECHO LV EDV A4C: 135 ML
ECHO LV EDV INDEX A4C: 60 ML/M2
ECHO LV EDV NDEX A2C: 66 ML/M2
ECHO LV EJECTION FRACTION A2C: 58 %
ECHO LV EJECTION FRACTION A4C: 55 %
ECHO LV EJECTION FRACTION BIPLANE: 56 % (ref 55–100)
ECHO LV ESV A2C: 62 ML
ECHO LV ESV A4C: 61 ML
ECHO LV ESV INDEX A2C: 28 ML/M2
ECHO LV ESV INDEX A4C: 27 ML/M2
ECHO LV FRACTIONAL SHORTENING: 33 % (ref 28–44)
ECHO LV INTERNAL DIMENSION DIASTOLE INDEX: 2.19 CM/M2
ECHO LV INTERNAL DIMENSION DIASTOLIC: 4.9 CM (ref 3.9–5.3)
ECHO LV INTERNAL DIMENSION SYSTOLIC INDEX: 1.47 CM/M2
ECHO LV INTERNAL DIMENSION SYSTOLIC: 3.3 CM
ECHO LV IVSD: 1 CM (ref 0.6–0.9)
ECHO LV MASS 2D: 188.1 G (ref 67–162)
ECHO LV MASS INDEX 2D: 84 G/M2 (ref 43–95)
ECHO LV POSTERIOR WALL DIASTOLIC: 1.1 CM (ref 0.6–0.9)
ECHO LV RELATIVE WALL THICKNESS RATIO: 0.45
ECHO LVOT AREA: 3.5 CM2
ECHO LVOT AV VTI INDEX: 0.66
ECHO LVOT DIAM: 2.1 CM
ECHO LVOT MEAN GRADIENT: 2 MMHG
ECHO LVOT PEAK GRADIENT: 4 MMHG
ECHO LVOT PEAK VELOCITY: 1 M/S
ECHO LVOT STROKE VOLUME INDEX: 31.7 ML/M2
ECHO LVOT SV: 71 ML
ECHO LVOT VTI: 20.5 CM
ECHO MV A VELOCITY: 0.57 M/S
ECHO MV AREA VTI: 2.3 CM2
ECHO MV E DECELERATION TIME (DT): 214 MS
ECHO MV E VELOCITY: 1.05 M/S
ECHO MV E/A RATIO: 1.84
ECHO MV E/E' LATERAL: 7
ECHO MV E/E' RATIO (AVERAGED): 8.75
ECHO MV E/E' SEPTAL: 10.5
ECHO MV LVOT VTI INDEX: 1.49
ECHO MV MAX VELOCITY: 1.1 M/S
ECHO MV MEAN GRADIENT: 2 MMHG
ECHO MV MEAN VELOCITY: 0.6 M/S
ECHO MV PEAK GRADIENT: 5 MMHG
ECHO MV VTI: 30.6 CM
ECHO PV ACCELERATION TIME (AT): 119 MS
ECHO PV MAX VELOCITY: 0.9 M/S
ECHO PV PEAK GRADIENT: 3 MMHG
ECHO RIGHT VENTRICULAR SYSTOLIC PRESSURE (RVSP): 45 MMHG
ECHO RV BASAL DIMENSION: 4 CM
ECHO RV FREE WALL PEAK S': 13 CM/S
ECHO RV TAPSE: 2.4 CM (ref 1.7–?)
ECHO TV REGURGITANT MAX VELOCITY: 3.03 M/S
ECHO TV REGURGITANT PEAK GRADIENT: 37 MMHG
GLUCOSE SERPL-MCNC: 98 MG/DL (ref 65–100)
LMWH PPP CHRO-ACNC: 1.25 IU/ML (ref 0.5–1)
MAGNESIUM SERPL-MCNC: 1.8 MG/DL (ref 1.8–2.4)
POTASSIUM SERPL-SCNC: 3.5 MMOL/L (ref 3.5–5.1)
SODIUM SERPL-SCNC: 143 MMOL/L (ref 133–143)

## 2023-08-31 PROCEDURE — 6370000000 HC RX 637 (ALT 250 FOR IP): Performed by: STUDENT IN AN ORGANIZED HEALTH CARE EDUCATION/TRAINING PROGRAM

## 2023-08-31 PROCEDURE — 93306 TTE W/DOPPLER COMPLETE: CPT

## 2023-08-31 PROCEDURE — 93306 TTE W/DOPPLER COMPLETE: CPT | Performed by: INTERNAL MEDICINE

## 2023-08-31 PROCEDURE — 6360000002 HC RX W HCPCS: Performed by: OBSTETRICS & GYNECOLOGY

## 2023-08-31 PROCEDURE — 6370000000 HC RX 637 (ALT 250 FOR IP): Performed by: OBSTETRICS & GYNECOLOGY

## 2023-08-31 PROCEDURE — 80048 BASIC METABOLIC PNL TOTAL CA: CPT

## 2023-08-31 PROCEDURE — 83735 ASSAY OF MAGNESIUM: CPT

## 2023-08-31 PROCEDURE — 36415 COLL VENOUS BLD VENIPUNCTURE: CPT

## 2023-08-31 PROCEDURE — 85520 HEPARIN ASSAY: CPT

## 2023-08-31 RX ORDER — ENOXAPARIN SODIUM 150 MG/ML
1 INJECTION SUBCUTANEOUS EVERY 12 HOURS
Qty: 144 ML | Refills: 0 | Status: SHIPPED | OUTPATIENT
Start: 2023-08-31 | End: 2023-11-29

## 2023-08-31 RX ORDER — ENOXAPARIN SODIUM 150 MG/ML
1 INJECTION SUBCUTANEOUS EVERY 12 HOURS
Qty: 60 ML | Refills: 2 | Status: SHIPPED | OUTPATIENT
Start: 2023-08-31 | End: 2023-08-31 | Stop reason: HOSPADM

## 2023-08-31 RX ORDER — ENOXAPARIN SODIUM 150 MG/ML
1 INJECTION SUBCUTANEOUS EVERY 12 HOURS
Qty: 60 ML | Refills: 2 | Status: SHIPPED | OUTPATIENT
Start: 2023-08-31 | End: 2023-08-31 | Stop reason: SDUPTHER

## 2023-08-31 RX ADMIN — Medication 400 MG: at 08:15

## 2023-08-31 RX ADMIN — ENOXAPARIN SODIUM 135 MG: 150 INJECTION SUBCUTANEOUS at 08:00

## 2023-08-31 RX ADMIN — ESCITALOPRAM OXALATE 20 MG: 10 TABLET ORAL at 08:16

## 2023-08-31 NOTE — PROGRESS NOTES
Hospitalist Progress Note   Admit Date:  2023  5:57 PM   Name:  Lata Du   Age:  20 y.o. Sex:  female  :  2000   MRN:  [id number]   Room:  [facility]    Presenting/Chief Complaint: No chief complaint on file. Reason(s) for Admission: Encounter for induction of labor [Z34.90]  Normal labor [O80, Z37.9]     Hospital Course:   Patient is a 19 y/o female with medical history of gestational hypertension, obesity, depression admitted to Obstetrics service . Spontaneous vaginal delivery without incident 23. Plan for discharge home  with noted uneventful postpartum course. Hospitalist consulted for complaint of chest pain. Subjective & 24hr Events:   Patient alert, sitting upright in bed. Denies chest pain, shortness of breath. She is currently having difficulties with infant latching but otherwise without issues. Plan for discharge home today. Discussed etiology of blood clot including risk factors, treatment with anticoagulation and duration. Spouse and infant were present in room. I have provided discharge recommendations to [facility] team. Hematology referral placed. Hospitalist will sign off at this time; thank you for allowing us to participate in the care of this patient.     Assessment & Plan:     Encounter for induction of labor, multigravida  -s/p  23  -per primary     Major depressive disorder, recurrent episode, severe (HCC)  -Escitalopram 20 mg po daily     Class 3 severe obesity in adult ([address])  -Increases risk of pp PE  -Dietary and lifestyle modifications recommended    Single subsegmental pulmonary embolism without acute cor pulmonale (HCC)  -Echo obtained: normal systolic and diastolic function, mild to mod tricuspid regurgitation with mildly elevated RVSP of 45 mmHg  -Hemodynamics stable without tachycardia, hypoxia, hypotension  -Continue enoxaparin 1 mg/kg Q12H SQ on discharge for minimum of 3 months  -Note that patient is breastfeeding, in the future IntraVENous PRN    methylergonovine (METHERGINE) injection 200 mcg  200 mcg IntraMUSCular PRN    acetaminophen (TYLENOL) tablet 1,000 mg  1,000 mg Oral Q8H PRN    ibuprofen (ADVIL;MOTRIN) tablet 800 mg  800 mg Oral Q6H PRN    Rho(D) immune globulin (HYPERRHO;RHOGAM) injection 300 mcg  300 mcg IntraMUSCular Once    oxyCODONE (ROXICODONE) immediate release tablet 5 mg  5 mg Oral Q4H PRN    ondansetron (ZOFRAN-ODT) disintegrating tablet 8 mg  8 mg Oral Q8H PRN    docusate sodium (COLACE) capsule 100 mg  100 mg Oral BID    Medela Tender Care Lanolin cream   Topical PRN    witch hazel-glycerin (TUCKS) pad   Topical PRN    benzocaine-menthol (DERMOPLAST) 20-0.5 % spray   Topical PRN    measles, mumps & rubella vaccine (MMR) injection 0.5 mL  0.5 mL SubCUTAneous Prior to discharge    Tetanus-Diphth-Acell Pertussis (BOOSTRIX) injection 0.5 mL  0.5 mL IntraMUSCular Prior to discharge    morphine sulfate (PF) injection 2 mg  2 mg IntraVENous Q2H PRN    Or    morphine sulfate (PF) injection 4 mg  4 mg IntraVENous Q2H PRN    oxyCODONE (ROXICODONE) immediate release tablet 10 mg  10 mg Oral Q4H PRN     Signed: Rosalba Bell AGACNP-BC

## 2023-08-31 NOTE — LACTATION NOTE
This note was copied from a baby's chart. Mom has been pumping consistently. Current volume not quite at current volume needs, so did start supplementing based on baby's temperament. Mom reluctant to supplement, but agreeable. Wants to continue to use curved tip syringe at this point, which is reasonable. Discussed discontinuing curved tip syringe as volume needs get higher. Nipples are well everted, but if mom ends up using a nipple shield. A bottle at that point would be appropriate. Attempted to latch baby in cross cradle on R and she was immediately highly irritated. Very fussy at breast.  Swaddled to help calm, but still fussy. Semi-attempted to latch, but fell asleep. Nipples are everted and mom positions her well. No obvious reason for refusing to latch. Once milk is in (at least 15-30 ml on each side) if baby is still not latching, may benefit from a nipple shield. Nipple shields come in 3 sizes and need to be sized appropriately by a lactation consultant. Use caution with nipple shield. Look for softening of the breast and milk in the shield to assess intake and effectiveness with shield. Pump after nursing with shield for 5-10 minutes for at least the first 2 weeks to adequately establish supply. Feedback additional milk in a syringe or bottle if indicated. Watch output and feeding cues. An outpatient appointment for feed and weigh with nipple shield to check for adequate milk transfer is highly recommended. See progress notes for feeding plan. Paper copy given to mom and reviewed. Encouraged frequent feeding. Watch output. Call as needed.

## 2023-08-31 NOTE — DISCHARGE SUMMARY
Discharge Summary    [facility] Setting  :  2000  MRN:  [id number]    ADMIT DATE:  2023  DISCHARGE DATE:  2023    PRIMARY CARE PHYSICIAN:  Zuleika Aguirre MD    VISIT STATUS: Admission    CODE STATUS:  Full Code    DISCHARGE DIAGNOSES:  Principal Problem:    Encounter for induction of labor  Active Problems:    Multigravida in third trimester    Obesity affecting pregnancy in third trimester    Mental disorder affecting pregnancy in third trimester    Anemia during pregnancy in third trimester    Major depressive disorder, recurrent episode, severe ([address])    Normal labor and delivery    41 weeks gestation of pregnancy    Class 3 severe obesity in adult [facility])    Single subsegmental pulmonary embolism without acute cor pulmonale ([address])  Resolved Problems:    * No resolved hospital problems. *      HOSPITAL COURSE:  20 yr  now PPD 3 from an uncomplicated  at term, meeting all postpartum goals. She has a new diagnosis with a single subsegmental pulmonary embolism. She has been started on Lovenox 1mg/kg BID. SIGNIFICANT DIAGNOSTIC STUDIES:  CT chest  CONSULTANTS:  Internal medicine  RECOMMENDED NEXT STEPS:   -Continue enoxaparin 1 mg/kg Q12H SQ on discharge for minimum of 3 months  -Note that patient is breastfeeding, in the future may be able to switch to DOAC if no longer breastfeeding  -Referral for outpatient Hematology placed     DISCHARGE MEDICATIONS:         Medication List        START taking these medications      enoxaparin 120 MG/0.8ML injection  Commonly known as: Lovenox  Inject 0.8 mLs into the skin in the morning and 0.8 mLs in the evening. oxyCODONE 5 MG immediate release tablet  Commonly known as: ROXICODONE  Take 1 tablet by mouth every 8 hours as needed for Pain for up to 7 days.  Max Daily Amount: 15 mg            CONTINUE taking these medications      docusate sodium 100 MG capsule  Commonly known as: COLACE     escitalopram 20 MG tablet  Commonly known as: Carlos Edmonds

## 2023-08-31 NOTE — PROGRESS NOTES
Addendum: 8/31/23 at 430pm  [facility] [address] [place] [address]. 120mg Rx - 60 syringes does not require a PA- $0 pt copay. However, there is not enough supply in stock at the [facility] selected pharmacy. [facility] the [facility]ist at the [facility] on [address] and they have enough to fill it tonight. Updated Dr Verito Mcqueen agrees to have Rx sent to [address]; [facility] is transferring Rx now       Addendum: 8/31/23 at 230pm  Call rec'd from  [facility] that lovenox Rx was changed. TC to [facility] for urgent prior auth request for lovenox injection. Pending discharge. New Case # [id number]  Sig: Inject 0.8 mLs into the skin in the morning and 0.8 mLs in the evening. TC to [place] [phone] [facility] for urgent prior auth req for lovenox injections  BLVV#[id number]  ICD10: I26.93  Sig: Inject 0.87 mLs into the skin in the morning and 0.87 mLs in the evening.   60ml per 30 days  [place] states prior Vallorie Gum takes up to 24hrs and determination will come by fax

## 2023-09-07 ENCOUNTER — POSTPARTUM VISIT (OUTPATIENT)
Dept: OBGYN CLINIC | Age: 23
End: 2023-09-07
Payer: MEDICAID

## 2023-09-07 VITALS
OXYGEN SATURATION: 98 % | HEIGHT: 63 IN | HEART RATE: 81 BPM | WEIGHT: 266 LBS | SYSTOLIC BLOOD PRESSURE: 130 MMHG | BODY MASS INDEX: 47.13 KG/M2 | DIASTOLIC BLOOD PRESSURE: 86 MMHG

## 2023-09-07 DIAGNOSIS — I26.93 SINGLE SUBSEGMENTAL PULMONARY EMBOLISM WITHOUT ACUTE COR PULMONALE (HCC): ICD-10-CM

## 2023-09-07 PROBLEM — O99.213 OBESITY AFFECTING PREGNANCY IN THIRD TRIMESTER: Status: RESOLVED | Noted: 2023-03-08 | Resolved: 2023-09-07

## 2023-09-07 PROBLEM — Z87.59 HISTORY OF GESTATIONAL HYPERTENSION: Status: RESOLVED | Noted: 2023-03-08 | Resolved: 2023-09-07

## 2023-09-07 PROBLEM — Z34.90 ENCOUNTER FOR INDUCTION OF LABOR: Status: RESOLVED | Noted: 2023-08-28 | Resolved: 2023-09-07

## 2023-09-07 PROBLEM — Z3A.41 41 WEEKS GESTATION OF PREGNANCY: Status: RESOLVED | Noted: 2023-08-28 | Resolved: 2023-09-07

## 2023-09-07 PROBLEM — Z34.83 MULTIGRAVIDA IN THIRD TRIMESTER: Status: RESOLVED | Noted: 2023-03-08 | Resolved: 2023-09-07

## 2023-09-07 PROBLEM — O48.0 41 WEEKS GESTATION OF PREGNANCY: Status: RESOLVED | Noted: 2023-08-28 | Resolved: 2023-09-07

## 2023-09-07 PROCEDURE — 99213 OFFICE O/P EST LOW 20 MIN: CPT | Performed by: NURSE PRACTITIONER

## 2023-09-07 NOTE — PROGRESS NOTES
I have reviewed the patient's visit today including history, exam and assessment by ABHIJEET Bright. I agree with treatment/plan as above.     Shelby Santiago MD  3:23 PM  09/07/23

## 2023-09-07 NOTE — PROGRESS NOTES
HPI:    Maribeth Ortiz is a 20 y.o. [id number] who is here for her 1 week postpartum visit for BP check, depression follow-up and PE followup. Delivery complicated with dx of pulmonary embolism. Pt prescribed Lovenox and referral to hem/onc placed. Pt states she has not heard yet from hem/onc to schedule appt. She stopped her lovenox because the injections \"hurt\"       Date of Delivery:2023    Feeding: breast    Birth Control: none    Vaginal Bleeding lighter than menses. Denies abdominal pain    Denies dysuria, urgency or frequency    Denies constipation or diarrhea    Denies fever, chills, CP, SOB, cough, or leg pain or swelling. EPDS 19. Pt here today with . She is taking Lexapro 20 mg. Prior to pregnancy was also on trazadone but this makes her too sleepy with . Pt tearful when we start talking about anticoagulation and risks of untreated PE including death. Pt has already scheduled appt with therapist and psychiatry via impact program. Has tried other medications in the past but the lexapro and trazadone are what has worked the best      PE:    Constitutional:  well-developed, well-nourished, and in no distress. Mental: Alert and awake. Oriented to person/place/time. Able to follow commands    Eyes: EOM nl, Sclera nl, Ocular Discharge not visualized    HENT: Normocephalic and atraumatic. Mouth/Throat: Mucous membranes are moist. External Ears: nl    Neck: Normal range of motion. Neck supple. Pulmonary: Effort normal; No visualized signs of difficulty breathing or respiratory distress      Musculoskeletal: Normal range of motion. Normal gait with no signs of ataxia     Neurological: No Facial Asymmetry (Cranial nerve 7 motor function); No gaze palsy; normal coordination, muscle strength and tone      Skin: Skin is warm and dry. Skin: No significant exanthematous lesions or discoloration noted on facial skin      Psych: Normal affect.  No hallucinations          Past

## 2023-09-07 NOTE — PROGRESS NOTES
Patient comes in today for concerns of PPD. Delivery Method: Vaginal     Delivery Date: 8/28/2023     Birth Control: none    Breast/Bottle: both-breast and formula     Bleeding: small-moderate, patient states it feels like a period. Baby: Doing well    Bowel/Bladder: No issues    Last pap smear:  1/25/2021, neg., HPV neg.      Vitals:    09/07/23 1348   BP: 130/86      98% SP02 and 81 BPM.     Mary Brown RN  2:30 PM  09/07/23

## 2023-09-12 ENCOUNTER — FOLLOWUP TELEPHONE ENCOUNTER (OUTPATIENT)
Dept: CASE MANAGEMENT | Age: 23
End: 2023-09-12

## 2023-09-12 NOTE — TELEPHONE ENCOUNTER
Phone call to patient at [phone] due to EPDS score of 14 after delivery. Per patient, \"Things have been a little crazy\" between having a toddler and a . She confirms that she's been contacted by [facility] to be enrolled in medication management support.  previously provided patient with a list of therapists that accept [facility]; however, she hasn't had the opportunity to contact anyone yet. Patient denied any needs at this time and is agreeable for  to call back again in the future. Additionally,  encouraged patient to reach out if any needs/questions arise.     EPHRAIM Rushing, [address] [place] [address]   [phone]

## 2023-09-27 ENCOUNTER — POSTPARTUM VISIT (OUTPATIENT)
Dept: OBGYN CLINIC | Age: 23
End: 2023-09-27
Payer: MEDICAID

## 2023-09-27 VITALS
BODY MASS INDEX: 48.2 KG/M2 | OXYGEN SATURATION: 98 % | WEIGHT: 272 LBS | HEART RATE: 87 BPM | DIASTOLIC BLOOD PRESSURE: 64 MMHG | HEIGHT: 63 IN | SYSTOLIC BLOOD PRESSURE: 112 MMHG

## 2023-09-27 DIAGNOSIS — I26.93 SINGLE SUBSEGMENTAL PULMONARY EMBOLISM WITHOUT ACUTE COR PULMONALE (HCC): Primary | ICD-10-CM

## 2023-09-27 PROCEDURE — 99213 OFFICE O/P EST LOW 20 MIN: CPT | Performed by: NURSE PRACTITIONER

## 2023-09-28 ENCOUNTER — HOSPITAL ENCOUNTER (OUTPATIENT)
Dept: CT IMAGING | Age: 23
Discharge: HOME | End: 2023-09-28
Payer: MEDICAID

## 2023-09-28 DIAGNOSIS — I26.93 SINGLE SUBSEGMENTAL PULMONARY EMBOLISM WITHOUT ACUTE COR PULMONALE (HCC): ICD-10-CM

## 2023-09-28 PROCEDURE — 71260 CT THORAX DX C+: CPT

## 2023-09-28 PROCEDURE — 6360000004 HC RX CONTRAST MEDICATION: Performed by: NURSE PRACTITIONER

## 2023-09-28 RX ADMIN — IOPAMIDOL 100 ML: 755 INJECTION, SOLUTION INTRAVENOUS at 17:30

## 2023-09-28 NOTE — PROGRESS NOTES
I have reviewed the patient's visit today including history, exam and assessment by ABHIJEET Denis. I agree with treatment/plan as above.     Toro Mckeon MD  9:14 AM  09/28/23
Patient comes in today for f/u on PPD. Patient has still not continued lovenox therapy and still has intermittent SOB. Delivery Method: Vaginal     Delivery Date: 8/28/2023     Birth Control: none    Breast/Bottle: both     Bleeding: none    Baby: Doing well    Bowel/Bladder: No issues    Blues: None    Last pap smear:  1/25/2021, neg., HPV neg.     Vitals:    09/27/23 1041   BP: 112/64   Pulse: 87   SpO2: 98%        Bozena Wing RN  10:49 AM  09/27/23
Vital Sign     Unable to Pay for Housing in the Last Year: Not on file     Number of Places Lived in the Last Year: Not on file     Unstable Housing in the Last Year: No           Objective:    Vitals:    09/27/23 1041   BP: 112/64   Site: Left Upper Arm   Position: Sitting   Pulse: 87   SpO2: 98%   Weight: 272 lb (123.4 kg)   Height: 5' 3\" (1.6 m)         Constitutional:  well-developed, well-nourished, and in no distress. Mental: Alert and awake. Oriented to person/place/time. Able to follow commands    Eyes: EOM nl, Sclera nl, Ocular Discharge not visualized    HENT: Normocephalic and atraumatic. Mouth/Throat: Mucous membranes are moist    External Ears: nl    Neck: Normal range of motion. No masses visualized       Pulmonary: Effort normal. No visualized signs of difficulty breathing or respiratory distress. Coars breath sounds to right lower lobe    Musculoskeletal: Normal range of motion. Normal gait with no signs of ataxia. No BLE edema noted     Neurological: No Facial Asymmetry (Cranial nerve 7 motor function) No gaze palsy    Skin: No significant exanthematous lesions or discoloration noted on facial skin      Psych: Normal affect. No hallucinations              Assessment/Plan            Patient Active Problem List    Diagnosis Date Noted    Metatarsal fracture, pathologic, left, sequela 05/31/2023     Priority: Medium     Overview Note:     Rolled foot last week; nondisplaced fracture. Can't take NSAIDs. Gave #20 5mg roxicodone, 0 refills. Sees ortho next week. Postpartum depression 03/08/2023     Priority: Medium     Overview Note:     Hx depression, anxiety. On lexapro and trazadone prepreg  04/17/23 [facility]: Reports stable mood on Lexapro and Trazadone. 05/31/23 [facility]: Increased stress related to recent fracture in L foot. Pt feels Lexapro and Trazadone working.   6/27/23: Moods stable on trazadone and lexapro. Having insomnia despite trazadone.  We discuss adding magnesium or

## 2023-09-29 ENCOUNTER — TELEPHONE (OUTPATIENT)
Dept: OBGYN CLINIC | Age: 23
End: 2023-09-29

## 2023-09-29 NOTE — TELEPHONE ENCOUNTER
Spoke with pt this morning. Pt has been non compliant with anticoagulation therapy since dx with PE after delivery 8/28/23. Repeat CT yesterday negative for PE - appears to have resolved. Pt advised she still needs to keep hemonc appt. She asks if she is safe to travel which I do not recommend given she is 4 weeks PPV, had PE and still at higher risk for VTE, Pt is made aware of recurrent VTE signs and symptoms and advised to got o ER if they occur. She verbalizes understanding    6 wk PPV scheduled.

## 2023-10-03 ENCOUNTER — FOLLOWUP TELEPHONE ENCOUNTER (OUTPATIENT)
Dept: CASE MANAGEMENT | Age: 23
End: 2023-10-03

## 2023-10-03 NOTE — TELEPHONE ENCOUNTER
Phone call to patient at [phone] due to EPDS score of 14 after delivery. No answer; message left requesting call back.        EPHRAIM Hood, [address] [facility]   [phone]

## 2023-10-11 ENCOUNTER — FOLLOWUP TELEPHONE ENCOUNTER (OUTPATIENT)
Dept: CASE MANAGEMENT | Age: 23
End: 2023-10-11

## 2023-10-11 NOTE — TELEPHONE ENCOUNTER
Phone call to patient at [phone] due to EPDS of 14 after delivery. Patient denies any ongoing depression/anxiety postpartum. Per patient, I'm struggling to keep up with how much she's eating. \"  Patient with questions about pumping.  will have lactation follow-up with patient. No additional needs at this time.     EPHRAIM Gunderson, [address] [place] [address]   [phone]

## 2023-10-23 NOTE — PROGRESS NOTES
NEW PATIENT INTAKE    Referral Diagnosis: Single subsegmental pulmonary embolism without acute cor pulmonale    Referring Provider: GARLAND Price - CNP    Primary Care Provider: Carine Benton MD    Family History of Cancer/ Hematology Disorders: Family history significant for father with brain cancer, paternal grandmother with lung cancer and paternal grandfather with prostate cancer. Presenting Symptoms: Single subsegmental pulmonary embolism without acute cor pulmonale    Chronological History of Pertinent Events:     29-year-old white female    23 - Met with PCP ([facility])  PMH: gestational hypertension, obesity, depression, MDD   Admitted to Obstetrics service . Spontaneous vaginal delivery without incident 23. S/p  23  Plan for discharge home  with noted uneventful postpartum course. Hospitalist consulted for complaint of chest pain. Discussed etiology of blood clot including risk factors, treatment with anticoagulation and duration. Hematology referral placed. Hospitalist will sign off at this time. Single subsegmental pulmonary embolism without acute cor pulmonale ([address])    23 - CT CHEST PULMONARY EMBOLISM W CONTRAST revealing: ([facility])  IMPRESSION:  Trace pulmonary embolism in a subsegmental artery of the posterior segment of the right lower lobe.  (EPIC)  Echo obtained: normal systolic and diastolic function, mild to mod tricuspid regurgitation with mildly elevated RVSP of 45 mmHg (EPIC)  Hemodynamics stable without tachycardia, hypoxia, hypotension  Continue enoxaparin 1 mg/kg Q12H SQ on discharge for minimum of 3 months  Note that patient is breastfeeding, in the future may be able to switch to [address] if no longer breastfeeding  Referral for outpatient Hematology placed  Discussed recommendations with primary team    23 - Met with Gynecologist Office ([facility])  [id number] who is here today for follow-up  Reports not continuing on Lovenox, due to pain with

## 2023-10-24 ENCOUNTER — TELEPHONE (OUTPATIENT)
Dept: ONCOLOGY | Age: 23
End: 2023-10-24

## 2023-10-25 ENCOUNTER — HOSPITAL ENCOUNTER (OUTPATIENT)
Dept: CT IMAGING | Age: 23
Discharge: HOME | End: 2023-10-27
Payer: MEDICAID

## 2023-10-25 ENCOUNTER — OFFICE VISIT (OUTPATIENT)
Dept: ONCOLOGY | Age: 23
End: 2023-10-25
Payer: MEDICAID

## 2023-10-25 ENCOUNTER — HOSPITAL ENCOUNTER (OUTPATIENT)
Dept: LAB | Age: 23
Discharge: HOME | End: 2023-10-28
Payer: MEDICAID

## 2023-10-25 VITALS
RESPIRATION RATE: 16 BRPM | WEIGHT: 285 LBS | OXYGEN SATURATION: 94 % | TEMPERATURE: 98.3 F | HEART RATE: 88 BPM | SYSTOLIC BLOOD PRESSURE: 112 MMHG | HEIGHT: 63 IN | BODY MASS INDEX: 50.5 KG/M2 | DIASTOLIC BLOOD PRESSURE: 75 MMHG

## 2023-10-25 DIAGNOSIS — R07.9 CHEST PAIN, UNSPECIFIED TYPE: ICD-10-CM

## 2023-10-25 DIAGNOSIS — R10.9 ABDOMINAL PAIN, UNSPECIFIED ABDOMINAL LOCATION: ICD-10-CM

## 2023-10-25 DIAGNOSIS — I26.93 SINGLE SUBSEGMENTAL PULMONARY EMBOLISM WITHOUT ACUTE COR PULMONALE (HCC): ICD-10-CM

## 2023-10-25 DIAGNOSIS — R06.02 SHORTNESS OF BREATH ON EXERTION: ICD-10-CM

## 2023-10-25 DIAGNOSIS — I26.93 SINGLE SUBSEGMENTAL PULMONARY EMBOLISM WITHOUT ACUTE COR PULMONALE (HCC): Primary | ICD-10-CM

## 2023-10-25 LAB
ALBUMIN SERPL-MCNC: 3.6 G/DL (ref 3.5–5)
ALBUMIN/GLOB SERPL: 0.9 (ref 0.4–1.6)
ALP SERPL-CCNC: 92 U/L (ref 50–136)
ALT SERPL-CCNC: 27 U/L (ref 12–65)
ANION GAP SERPL CALC-SCNC: 4 MMOL/L (ref 2–11)
APTT PPP: 29 SEC (ref 24.5–34.2)
AST SERPL-CCNC: 12 U/L (ref 15–37)
BASOPHILS # BLD: 0.1 K/UL (ref 0–0.2)
BASOPHILS NFR BLD: 1 % (ref 0–2)
BILIRUB SERPL-MCNC: 0.3 MG/DL (ref 0.2–1.1)
BUN SERPL-MCNC: 14 MG/DL (ref 6–23)
CALCIUM SERPL-MCNC: 9.1 MG/DL (ref 8.3–10.4)
CHLORIDE SERPL-SCNC: 108 MMOL/L (ref 101–110)
CO2 SERPL-SCNC: 27 MMOL/L (ref 21–32)
CREAT SERPL-MCNC: 0.7 MG/DL (ref 0.6–1)
D DIMER PPP FEU-MCNC: 0.32 UG/ML(FEU)
DIFFERENTIAL METHOD BLD: ABNORMAL
EOSINOPHIL # BLD: 0.2 K/UL (ref 0–0.8)
EOSINOPHIL NFR BLD: 3 % (ref 0.5–7.8)
ERYTHROCYTE [DISTWIDTH] IN BLOOD BY AUTOMATED COUNT: 15.9 % (ref 11.9–14.6)
FIBRINOGEN PPP-MCNC: 417 MG/DL (ref 190–501)
GLOBULIN SER CALC-MCNC: 3.8 G/DL (ref 2.8–4.5)
GLUCOSE SERPL-MCNC: 86 MG/DL (ref 65–100)
HCT VFR BLD AUTO: 41.6 % (ref 35.8–46.3)
HGB BLD-MCNC: 13 G/DL (ref 11.7–15.4)
IMM GRANULOCYTES # BLD AUTO: 0 K/UL (ref 0–0.5)
IMM GRANULOCYTES NFR BLD AUTO: 0 % (ref 0–5)
INR PPP: 1
LYMPHOCYTES # BLD: 3.4 K/UL (ref 0.5–4.6)
LYMPHOCYTES NFR BLD: 35 % (ref 13–44)
Lab: NORMAL
MCH RBC QN AUTO: 25.3 PG (ref 26.1–32.9)
MCHC RBC AUTO-ENTMCNC: 31.3 G/DL (ref 31.4–35)
MCV RBC AUTO: 80.9 FL (ref 82–102)
MONOCYTES # BLD: 0.6 K/UL (ref 0.1–1.3)
MONOCYTES NFR BLD: 7 % (ref 4–12)
NEUTS SEG # BLD: 5.4 K/UL (ref 1.7–8.2)
NEUTS SEG NFR BLD: 54 % (ref 43–78)
NRBC # BLD: 0 K/UL (ref 0–0.2)
PLATELET # BLD AUTO: 374 K/UL (ref 150–450)
PMV BLD AUTO: 9.7 FL (ref 9.4–12.3)
POTASSIUM SERPL-SCNC: 4.3 MMOL/L (ref 3.5–5.1)
PROT SERPL-MCNC: 7.4 G/DL (ref 6.3–8.2)
PROTHROMBIN TIME: 13.1 SEC (ref 12.6–14.3)
RBC # BLD AUTO: 5.14 M/UL (ref 4.05–5.2)
REFERENCE LAB: NORMAL
SODIUM SERPL-SCNC: 139 MMOL/L (ref 133–143)
THROMBIN TIME: 16.8 SECS (ref 15.4–17.9)
TT IMM NP PPP: 16.8 SECS
WBC # BLD AUTO: 9.8 K/UL (ref 4.3–11.1)

## 2023-10-25 PROCEDURE — 85305 CLOT INHIBIT PROT S TOTAL: CPT

## 2023-10-25 PROCEDURE — 99205 OFFICE O/P NEW HI 60 MIN: CPT | Performed by: PEDIATRICS

## 2023-10-25 PROCEDURE — 85670 THROMBIN TIME PLASMA: CPT

## 2023-10-25 PROCEDURE — 74174 CTA ABD&PLVS W/CONTRAST: CPT

## 2023-10-25 PROCEDURE — 85732 THROMBOPLASTIN TIME PARTIAL: CPT

## 2023-10-25 PROCEDURE — 85240 CLOT FACTOR VIII AHG 1 STAGE: CPT

## 2023-10-25 PROCEDURE — 85301 ANTITHROMBIN III ANTIGEN: CPT

## 2023-10-25 PROCEDURE — 83090 ASSAY OF HOMOCYSTEINE: CPT

## 2023-10-25 PROCEDURE — 85025 COMPLETE CBC W/AUTO DIFF WBC: CPT

## 2023-10-25 PROCEDURE — 85300 ANTITHROMBIN III ACTIVITY: CPT

## 2023-10-25 PROCEDURE — 85379 FIBRIN DEGRADATION QUANT: CPT

## 2023-10-25 PROCEDURE — 85610 PROTHROMBIN TIME: CPT

## 2023-10-25 PROCEDURE — 86769 SARS-COV-2 COVID-19 ANTIBODY: CPT

## 2023-10-25 PROCEDURE — 81241 F5 GENE: CPT

## 2023-10-25 PROCEDURE — 6360000004 HC RX CONTRAST MEDICATION: Performed by: PEDIATRICS

## 2023-10-25 PROCEDURE — 86038 ANTINUCLEAR ANTIBODIES: CPT

## 2023-10-25 PROCEDURE — 85384 FIBRINOGEN ACTIVITY: CPT

## 2023-10-25 PROCEDURE — 85613 RUSSELL VIPER VENOM DILUTED: CPT

## 2023-10-25 PROCEDURE — 85705 THROMBOPLASTIN INHIBITION: CPT

## 2023-10-25 PROCEDURE — 85730 THROMBOPLASTIN TIME PARTIAL: CPT

## 2023-10-25 PROCEDURE — 80053 COMPREHEN METABOLIC PANEL: CPT

## 2023-10-25 PROCEDURE — 86147 CARDIOLIPIN ANTIBODY EA IG: CPT

## 2023-10-25 PROCEDURE — 85306 CLOT INHIBIT PROT S FREE: CPT

## 2023-10-25 PROCEDURE — 85245 CLOT FACTOR VIII VW RISTOCTN: CPT

## 2023-10-25 PROCEDURE — 36415 COLL VENOUS BLD VENIPUNCTURE: CPT

## 2023-10-25 PROCEDURE — 85302 CLOT INHIBIT PROT C ANTIGEN: CPT

## 2023-10-25 PROCEDURE — 81240 F2 GENE: CPT

## 2023-10-25 PROCEDURE — 83695 ASSAY OF LIPOPROTEIN(A): CPT

## 2023-10-25 PROCEDURE — 85303 CLOT INHIBIT PROT C ACTIVITY: CPT

## 2023-10-25 PROCEDURE — 86146 BETA-2 GLYCOPROTEIN ANTIBODY: CPT

## 2023-10-25 PROCEDURE — 85246 CLOT FACTOR VIII VW ANTIGEN: CPT

## 2023-10-25 RX ADMIN — IOPAMIDOL 100 ML: 755 INJECTION, SOLUTION INTRAVENOUS at 13:53

## 2023-10-25 ASSESSMENT — PATIENT HEALTH QUESTIONNAIRE - PHQ9
SUM OF ALL RESPONSES TO PHQ QUESTIONS 1-9: 0
SUM OF ALL RESPONSES TO PHQ QUESTIONS 1-9: 0
1. LITTLE INTEREST OR PLEASURE IN DOING THINGS: 0
2. FEELING DOWN, DEPRESSED OR HOPELESS: 0
SUM OF ALL RESPONSES TO PHQ9 QUESTIONS 1 & 2: 0
SUM OF ALL RESPONSES TO PHQ QUESTIONS 1-9: 0
SUM OF ALL RESPONSES TO PHQ QUESTIONS 1-9: 0

## 2023-10-25 NOTE — PROGRESS NOTES
~1/2 thrombosis lab work up was not completed. Notified patient and pt agreeable to come back for labs today. Notified registration, lab draw station, and lab.

## 2023-10-25 NOTE — PROGRESS NOTES
HISTORY OF PRESENT ILLNESS  Marcie Henderson is a 20 y.o. y.o. female with PE post partum    ABSTRACT  Referral Diagnosis: Single subsegmental pulmonary embolism without acute cor pulmonale     Referring Provider: GARLAND Henry - CNP     Primary Care Provider: Ria Morgan MD     Family History of Cancer/ Hematology Disorders: Family history significant for father with brain cancer, paternal grandmother with lung cancer and paternal grandfather with prostate cancer. Presenting Symptoms: Single subsegmental pulmonary embolism without acute cor pulmonale     Chronological History of Pertinent Events:      47-year-old white female     23 - Met with PCP ([facility])  PMH: gestational hypertension, obesity, depression, MDD   Admitted to Obstetrics service . Spontaneous vaginal delivery without incident 23. S/p  23  Plan for discharge home  with noted uneventful postpartum course. Hospitalist consulted for complaint of chest pain. Discussed etiology of blood clot including risk factors, treatment with anticoagulation and duration. Hematology referral placed. Hospitalist will sign off at this time. Single subsegmental pulmonary embolism without acute cor pulmonale ([address])     23 - CT CHEST PULMONARY EMBOLISM W CONTRAST revealing: ([facility])  IMPRESSION:  Trace pulmonary embolism in a subsegmental artery of the posterior segment of the right lower lobe.  (EPIC)  Echo obtained: normal systolic and diastolic function, mild to mod tricuspid regurgitation with mildly elevated RVSP of 45 mmHg (EPIC)  Hemodynamics stable without tachycardia, hypoxia, hypotension  Continue enoxaparin 1 mg/kg Q12H SQ on discharge for minimum of 3 months  Note that patient is breastfeeding, in the future may be able to switch to [address] if no longer breastfeeding  Referral for outpatient Hematology placed  Discussed recommendations with primary team     23 - Met with Gynecologist Office ([id number] [address])  [id number] who is here

## 2023-10-25 NOTE — PATIENT INSTRUCTIONS
procedure for instructions regarding your blood thinner.  -Avoid contact sports or strenuous activity due to risk of trauma and bleeding.  -Avoid NSAIDS while on blood thinner (aspirin, advil, aleve) unless approved by your prescriber/provider. Follow Up:      Recent Lab Results:      Treatment Summary has been discussed and given to patient: NA        -------------------------------------------------------------------------------------------------------------------  Please call our office at [phone] if you have any  of the following symptoms:   Fever of 100.5 or greater  Chills  Shortness of breath  Swelling or pain in one leg    After office hours an answering service is available and will contact a provider for emergencies or if you are experiencing any of the above symptoms. Patient has My Chart. My Chart log in information explained on the after visit summary printout at the [address] desk.

## 2023-10-26 LAB
ANA SER QL: NEGATIVE
AT III AG PPP IA-ACNC: 102 % (ref 72–124)
AT III PPP CHRO-ACNC: 124 % (ref 75–135)
CARDIOLIPIN IGA SER IA-ACNC: <9 APL U/ML (ref 0–11)
CARDIOLIPIN IGG SER IA-ACNC: <9 GPL U/ML (ref 0–14)
CARDIOLIPIN IGM SER IA-ACNC: <9 MPL U/ML (ref 0–12)
HCYS SERPL-SCNC: 8.8 UMOL/L (ref 0–14.5)
LPA SERPL-SCNC: <8.4 NMOL/L
PROT C PPP-ACNC: 143 % (ref 73–180)

## 2023-10-27 LAB
APTT SCREEN TO CONFIRM RATIO: 1.15 RATIO (ref 0–1.34)
B2 GLYCOPROT1 IGA SER-ACNC: <9 GPI IGA UNITS (ref 0–25)
B2 GLYCOPROT1 IGG SER-ACNC: <9 GPI IGG UNITS (ref 0–20)
B2 GLYCOPROT1 IGM SER-ACNC: <9 GPI IGM UNITS (ref 0–32)
CONFIRM APTT/NORMAL: 37.3 SEC (ref 0–47.6)
FACT VIII ACT/NOR PPP: 134 % (ref 56–140)
FACT VIII ACT/NOR PPP: 151 % (ref 56–140)
LA 2 SCREEN W REFLEX-IMP: NORMAL
PROT S ACT/NOR PPP: 72 % (ref 63–140)
PROT S AG ACT/NOR PPP IA: 85 % (ref 60–150)
PROT S FREE AG ACT/NOR PPP IA: 95 % (ref 61–136)
SCREEN APTT: 32.8 SEC (ref 0–43.5)
SCREEN DRVVT: 43.6 SEC (ref 0–47)
THROMBIN TIME: 16.1 SEC (ref 0–23)
VWF AG ACT/NOR PPP IA: 148 % (ref 50–200)
VWF:RCO ACT/NOR PPP PL AGG: 148 % (ref 50–200)

## 2023-10-30 LAB — PROT C AG PPP IA-ACNC: 94 % (ref 60–150)

## 2023-11-01 LAB
F2 GENE MUT ANL BLD/T: NORMAL
F5 P.R506Q BLD/T QL: NORMAL
IMP & REVIEW OF LAB RESULTS: NORMAL
IMP & REVIEW OF LAB RESULTS: NORMAL

## 2023-11-07 LAB — SARS-COV-2 AB SERPL QL IA: NORMAL

## 2023-11-24 ENCOUNTER — TELEPHONE (OUTPATIENT)
Dept: ONCOLOGY | Age: 23
End: 2023-11-24

## 2023-11-24 DIAGNOSIS — Z79.01 ANTICOAGULATED: ICD-10-CM

## 2023-11-24 DIAGNOSIS — I26.93 SINGLE SUBSEGMENTAL PULMONARY EMBOLISM WITHOUT ACUTE COR PULMONALE (HCC): Primary | ICD-10-CM

## 2023-12-11 ENCOUNTER — TELEPHONE (OUTPATIENT)
Dept: FAMILY MEDICINE CLINIC | Facility: CLINIC | Age: 23
End: 2023-12-11

## 2023-12-11 NOTE — TELEPHONE ENCOUNTER
Patient called stating she is needing her Prolactin levels checked. Asked patient whom told her she needed these levels checked and she stated OBGYN. Asked patient why they weren't able to place labs; she said it was \"complicated\". When asked for patient to explain what \"complicated\" meant she said \"they messed up my whole pregnancy and I had complications during my birth because of them. \" I asked patient why she was told she need her prolactin levels checked and she stated \"it's because I'm not producing enough milk for my child and my child is strictly only breast fed. \" I advised patient that provider is out of the office and to try to call OBGYN back. States that she has multiple times in the past month and they never call her back. Advised patient that providers first available appointment isn't until the end of January. Patient is wanting to know if provider would just put in lab work and tell her what she does and doesn't need to do. Advised patient  or [facility] to be seen; patient refuses saying 'she doesn't want to go there and catch diseases; she is a very protective mother'. Advised patient I would place a referral to a different OBGYN since she is not happy with her current one; patient voiced thanks. Please advise.

## 2024-01-16 ENCOUNTER — OFFICE VISIT (OUTPATIENT)
Dept: ONCOLOGY | Age: 24
End: 2024-01-16
Payer: MEDICAID

## 2024-01-16 ENCOUNTER — HOSPITAL ENCOUNTER (OUTPATIENT)
Dept: LAB | Age: 24
Discharge: HOME | End: 2024-01-19
Payer: MEDICAID

## 2024-01-16 VITALS
DIASTOLIC BLOOD PRESSURE: 71 MMHG | WEIGHT: 293 LBS | HEIGHT: 63 IN | SYSTOLIC BLOOD PRESSURE: 106 MMHG | BODY MASS INDEX: 51.91 KG/M2 | HEART RATE: 80 BPM | TEMPERATURE: 97.8 F | RESPIRATION RATE: 16 BRPM | OXYGEN SATURATION: 95 %

## 2024-01-16 DIAGNOSIS — Z79.01 ANTICOAGULATED: ICD-10-CM

## 2024-01-16 DIAGNOSIS — E61.1 IRON DEFICIENCY: Primary | ICD-10-CM

## 2024-01-16 DIAGNOSIS — I26.93 SINGLE SUBSEGMENTAL PULMONARY EMBOLISM WITHOUT ACUTE COR PULMONALE (HCC): ICD-10-CM

## 2024-01-16 LAB
ALBUMIN SERPL-MCNC: 3.3 G/DL (ref 3.5–5)
ALBUMIN/GLOB SERPL: 0.9 (ref 0.4–1.6)
ALP SERPL-CCNC: 89 U/L (ref 50–136)
ALT SERPL-CCNC: 32 U/L (ref 12–65)
ANION GAP SERPL CALC-SCNC: 4 MMOL/L (ref 2–11)
AST SERPL-CCNC: 14 U/L (ref 15–37)
BASOPHILS # BLD: 0.1 K/UL (ref 0–0.2)
BASOPHILS NFR BLD: 1 % (ref 0–2)
BILIRUB SERPL-MCNC: 0.1 MG/DL (ref 0.2–1.1)
BUN SERPL-MCNC: 13 MG/DL (ref 6–23)
CALCIUM SERPL-MCNC: 8.7 MG/DL (ref 8.3–10.4)
CHLORIDE SERPL-SCNC: 109 MMOL/L (ref 103–113)
CO2 SERPL-SCNC: 25 MMOL/L (ref 21–32)
CREAT SERPL-MCNC: 0.8 MG/DL (ref 0.6–1)
DIFFERENTIAL METHOD BLD: ABNORMAL
EOSINOPHIL # BLD: 0.3 K/UL (ref 0–0.8)
EOSINOPHIL NFR BLD: 3 % (ref 0.5–7.8)
ERYTHROCYTE [DISTWIDTH] IN BLOOD BY AUTOMATED COUNT: 14.1 % (ref 11.9–14.6)
FERRITIN SERPL-MCNC: 6 NG/ML (ref 8–388)
GLOBULIN SER CALC-MCNC: 3.8 G/DL (ref 2.8–4.5)
GLUCOSE SERPL-MCNC: 117 MG/DL (ref 65–100)
HCT VFR BLD AUTO: 39.6 % (ref 35.8–46.3)
HGB BLD-MCNC: 12.4 G/DL (ref 11.7–15.4)
HGB RETIC QN AUTO: 29 PG (ref 29–35)
IMM GRANULOCYTES # BLD AUTO: 0 K/UL (ref 0–0.5)
IMM GRANULOCYTES NFR BLD AUTO: 1 % (ref 0–5)
IMM RETICS NFR: 12.4 % (ref 3–15.9)
IRON SATN MFR SERPL: 4 %
IRON SERPL-MCNC: 16 UG/DL (ref 35–150)
LYMPHOCYTES # BLD: 3 K/UL (ref 0.5–4.6)
LYMPHOCYTES NFR BLD: 35 % (ref 13–44)
MCH RBC QN AUTO: 25.1 PG (ref 26.1–32.9)
MCHC RBC AUTO-ENTMCNC: 31.3 G/DL (ref 31.4–35)
MCV RBC AUTO: 80 FL (ref 82–102)
MONOCYTES # BLD: 0.5 K/UL (ref 0.1–1.3)
MONOCYTES NFR BLD: 5 % (ref 4–12)
NEUTS SEG # BLD: 4.7 K/UL (ref 1.7–8.2)
NEUTS SEG NFR BLD: 55 % (ref 43–78)
NRBC # BLD: 0 K/UL (ref 0–0.2)
PLATELET # BLD AUTO: 356 K/UL (ref 150–450)
PMV BLD AUTO: 9.7 FL (ref 9.4–12.3)
POTASSIUM SERPL-SCNC: 4 MMOL/L (ref 3.5–5.1)
PROT SERPL-MCNC: 7.1 G/DL (ref 6.3–8.2)
RBC # BLD AUTO: 4.95 M/UL (ref 4.05–5.2)
RETICS # AUTO: 0.08 M/UL (ref 0.03–0.1)
RETICS/RBC NFR AUTO: 1.5 % (ref 0.3–2)
SODIUM SERPL-SCNC: 138 MMOL/L (ref 136–146)
TIBC SERPL-MCNC: 364 UG/DL (ref 250–450)
WBC # BLD AUTO: 8.5 K/UL (ref 4.3–11.1)

## 2024-01-16 PROCEDURE — 85046 RETICYTE/HGB CONCENTRATE: CPT

## 2024-01-16 PROCEDURE — 36415 COLL VENOUS BLD VENIPUNCTURE: CPT

## 2024-01-16 PROCEDURE — 99214 OFFICE O/P EST MOD 30 MIN: CPT | Performed by: PEDIATRICS

## 2024-01-16 PROCEDURE — 83540 ASSAY OF IRON: CPT

## 2024-01-16 PROCEDURE — 82728 ASSAY OF FERRITIN: CPT

## 2024-01-16 PROCEDURE — 85025 COMPLETE CBC W/AUTO DIFF WBC: CPT

## 2024-01-16 PROCEDURE — 83550 IRON BINDING TEST: CPT

## 2024-01-16 PROCEDURE — 80053 COMPREHEN METABOLIC PANEL: CPT

## 2024-01-16 ASSESSMENT — PATIENT HEALTH QUESTIONNAIRE - PHQ9
2. FEELING DOWN, DEPRESSED OR HOPELESS: 1
SUM OF ALL RESPONSES TO PHQ QUESTIONS 1-9: 2
1. LITTLE INTEREST OR PLEASURE IN DOING THINGS: 1
SUM OF ALL RESPONSES TO PHQ QUESTIONS 1-9: 2
SUM OF ALL RESPONSES TO PHQ9 QUESTIONS 1 & 2: 2

## 2024-01-16 NOTE — PATIENT INSTRUCTIONS
Follow up for pulmonary embolism  S/p vaginal delivery: 8-28-23  Pulmonary Embolism: 8-30-23      Off of Lovenox 120mg SQ every 12 hours  (stopped due to pain)     Currently Breastfeeding (intent for 1 year of breastfeeding)     Your CTA was negative/normal. Good news    The ultrasound still needs to be completed.  We will have our  help you get this set up.     Continue oral iron     Follow up: 6 months

## 2024-01-16 NOTE — PROGRESS NOTES
HISTORY OF PRESENT ILLNESS  Angeli is a 23 y.o. y.o. female with PE post partum    ABSTRACT  Referral Diagnosis: Single subsegmental pulmonary embolism without acute cor pulmonale     Referring Provider: Zoë Brunson APRN - CNP     Primary Care Provider: Mayur Selby MD     Family History of Cancer/ Hematology Disorders: Family history significant for father with brain cancer, paternal grandmother with lung cancer and paternal grandfather with prostate cancer.     Presenting Symptoms: Single subsegmental pulmonary embolism without acute cor pulmonale     Chronological History of Pertinent Events:      23-year-old white female     23 - Met with PCP ([facility])  PMH: gestational hypertension, obesity, depression, MDD   Admitted to Obstetrics service . Spontaneous vaginal delivery without incident 23.   S/p  23  Plan for discharge home  with noted uneventful postpartum course. Hospitalist consulted for complaint of chest pain.   Discussed etiology of blood clot including risk factors, treatment with anticoagulation and duration.   Hematology referral placed. Hospitalist will sign off at this time.  Single subsegmental pulmonary embolism without acute cor pulmonale (HCC)     23 - CT CHEST PULMONARY EMBOLISM W CONTRAST revealing: (EPIC)  IMPRESSION:  Trace pulmonary embolism in a subsegmental artery of the posterior segment of the right lower lobe. (EPIC)  Echo obtained: normal systolic and diastolic function, mild to mod tricuspid regurgitation with mildly elevated RVSP of 45 mmHg (EPIC)  Hemodynamics stable without tachycardia, hypoxia, hypotension  Continue enoxaparin 1 mg/kg Q12H SQ on discharge for minimum of 3 months  Note that patient is breastfeeding, in the future may be able to switch to DOAC if no longer breastfeeding  Referral for outpatient Hematology placed  Discussed recommendations with primary team     23 - Met with Gynecologist Office ([facility])   who is here

## 2024-01-30 ENCOUNTER — HOSPITAL ENCOUNTER (OUTPATIENT)
Dept: ULTRASOUND IMAGING | Age: 24
Discharge: HOME | End: 2024-02-02
Attending: PEDIATRICS
Payer: MEDICAID

## 2024-01-30 DIAGNOSIS — R07.9 CHEST PAIN, UNSPECIFIED TYPE: ICD-10-CM

## 2024-01-30 DIAGNOSIS — R10.9 ABDOMINAL PAIN, UNSPECIFIED ABDOMINAL LOCATION: ICD-10-CM

## 2024-01-30 DIAGNOSIS — I26.93 SINGLE SUBSEGMENTAL PULMONARY EMBOLISM WITHOUT ACUTE COR PULMONALE (HCC): ICD-10-CM

## 2024-01-30 DIAGNOSIS — R06.02 SHORTNESS OF BREATH ON EXERTION: ICD-10-CM

## 2024-01-30 PROCEDURE — 93970 EXTREMITY STUDY: CPT

## 2024-01-30 PROCEDURE — 93970 EXTREMITY STUDY: CPT | Performed by: RADIOLOGY

## 2024-09-05 ENCOUNTER — OFFICE VISIT (OUTPATIENT)
Dept: ONCOLOGY | Age: 24
End: 2024-09-05
Payer: MEDICAID

## 2024-09-05 ENCOUNTER — HOSPITAL ENCOUNTER (OUTPATIENT)
Dept: LAB | Age: 24
Discharge: HOME | End: 2024-09-08
Payer: MEDICAID

## 2024-09-05 VITALS
BODY MASS INDEX: 53.92 KG/M2 | HEIGHT: 62 IN | HEART RATE: 90 BPM | RESPIRATION RATE: 18 BRPM | TEMPERATURE: 97.8 F | OXYGEN SATURATION: 98 % | DIASTOLIC BLOOD PRESSURE: 86 MMHG | SYSTOLIC BLOOD PRESSURE: 134 MMHG | WEIGHT: 293 LBS

## 2024-09-05 DIAGNOSIS — D50.9 IRON DEFICIENCY ANEMIA, UNSPECIFIED IRON DEFICIENCY ANEMIA TYPE: Primary | ICD-10-CM

## 2024-09-05 DIAGNOSIS — E61.1 IRON DEFICIENCY: ICD-10-CM

## 2024-09-05 DIAGNOSIS — I26.93 SINGLE SUBSEGMENTAL PULMONARY EMBOLISM WITHOUT ACUTE COR PULMONALE (HCC): ICD-10-CM

## 2024-09-05 LAB
ALBUMIN SERPL-MCNC: 3.4 G/DL (ref 3.5–5)
ALBUMIN/GLOB SERPL: 1 (ref 1–1.9)
ALP SERPL-CCNC: 92 U/L (ref 35–104)
ALT SERPL-CCNC: 31 U/L (ref 12–65)
ANION GAP SERPL CALC-SCNC: 11 MMOL/L (ref 9–18)
AST SERPL-CCNC: 19 U/L (ref 15–37)
BASOPHILS # BLD: 0 K/UL (ref 0–0.2)
BASOPHILS NFR BLD: 0 % (ref 0–2)
BILIRUB SERPL-MCNC: 0.3 MG/DL (ref 0–1.2)
BUN SERPL-MCNC: 9 MG/DL (ref 6–23)
CALCIUM SERPL-MCNC: 9.2 MG/DL (ref 8.8–10.2)
CHLORIDE SERPL-SCNC: 106 MMOL/L (ref 98–107)
CO2 SERPL-SCNC: 22 MMOL/L (ref 20–28)
CREAT SERPL-MCNC: 0.65 MG/DL (ref 0.6–1.1)
DIFFERENTIAL METHOD BLD: ABNORMAL
EOSINOPHIL # BLD: 0.2 K/UL (ref 0–0.8)
EOSINOPHIL NFR BLD: 3 % (ref 0.5–7.8)
ERYTHROCYTE [DISTWIDTH] IN BLOOD BY AUTOMATED COUNT: 14.5 % (ref 11.9–14.6)
FERRITIN SERPL-MCNC: 27 NG/ML (ref 8–388)
GLOBULIN SER CALC-MCNC: 3.3 G/DL (ref 2.3–3.5)
GLUCOSE SERPL-MCNC: 117 MG/DL (ref 70–99)
HCT VFR BLD AUTO: 40.8 % (ref 35.8–46.3)
HGB BLD-MCNC: 12.7 G/DL (ref 11.7–15.4)
HGB RETIC QN AUTO: 29 PG (ref 29–35)
IMM GRANULOCYTES # BLD AUTO: 0.1 K/UL (ref 0–0.5)
IMM GRANULOCYTES NFR BLD AUTO: 1 % (ref 0–5)
IMM RETICS NFR: 12.4 % (ref 3–15.9)
IRON SATN MFR SERPL: 10 % (ref 20–50)
IRON SERPL-MCNC: 33 UG/DL (ref 35–100)
LYMPHOCYTES # BLD: 3 K/UL (ref 0.5–4.6)
LYMPHOCYTES NFR BLD: 38 % (ref 13–44)
MCH RBC QN AUTO: 24.2 PG (ref 26.1–32.9)
MCHC RBC AUTO-ENTMCNC: 31.1 G/DL (ref 31.4–35)
MCV RBC AUTO: 77.7 FL (ref 82–102)
MONOCYTES # BLD: 0.5 K/UL (ref 0.1–1.3)
MONOCYTES NFR BLD: 7 % (ref 4–12)
NEUTS SEG # BLD: 4.1 K/UL (ref 1.7–8.2)
NEUTS SEG NFR BLD: 51 % (ref 43–78)
NRBC # BLD: 0 K/UL (ref 0–0.2)
PLATELET # BLD AUTO: 358 K/UL (ref 150–450)
PMV BLD AUTO: 9.8 FL (ref 9.4–12.3)
POTASSIUM SERPL-SCNC: 4.2 MMOL/L (ref 3.5–5.1)
PROT SERPL-MCNC: 6.7 G/DL (ref 6.3–8.2)
RBC # BLD AUTO: 5.25 M/UL (ref 4.05–5.2)
RETICS # AUTO: 0.11 M/UL (ref 0.03–0.1)
RETICS/RBC NFR AUTO: 2.1 % (ref 0.3–2)
SODIUM SERPL-SCNC: 139 MMOL/L (ref 136–145)
TIBC SERPL-MCNC: 322 UG/DL (ref 240–450)
UIBC SERPL-MCNC: 289 UG/DL (ref 112–347)
WBC # BLD AUTO: 8 K/UL (ref 4.3–11.1)

## 2024-09-05 PROCEDURE — 83540 ASSAY OF IRON: CPT

## 2024-09-05 PROCEDURE — 80053 COMPREHEN METABOLIC PANEL: CPT

## 2024-09-05 PROCEDURE — 82728 ASSAY OF FERRITIN: CPT

## 2024-09-05 PROCEDURE — 85046 RETICYTE/HGB CONCENTRATE: CPT

## 2024-09-05 PROCEDURE — 83550 IRON BINDING TEST: CPT

## 2024-09-05 PROCEDURE — 36415 COLL VENOUS BLD VENIPUNCTURE: CPT

## 2024-09-05 PROCEDURE — 85025 COMPLETE CBC W/AUTO DIFF WBC: CPT

## 2024-09-05 PROCEDURE — 99214 OFFICE O/P EST MOD 30 MIN: CPT | Performed by: PEDIATRICS

## 2024-09-05 NOTE — PROGRESS NOTES
HISTORY OF PRESENT ILLNESS  Angeli is a 24 y.o. y.o. female with PE post partum    ABSTRACT  Referral Diagnosis: Single subsegmental pulmonary embolism without acute cor pulmonale     Referring Provider: Zoë Brunson APRN - CNP     Primary Care Provider: Mayur Selby MD     Family History of Cancer/ Hematology Disorders: Family history significant for father with brain cancer, paternal grandmother with lung cancer and paternal grandfather with prostate cancer.     Presenting Symptoms: Single subsegmental pulmonary embolism without acute cor pulmonale     Chronological History of Pertinent Events:      23-year-old white female     23 - Met with PCP ([facility])  PMH: gestational hypertension, obesity, depression, MDD   Admitted to Obstetrics service . Spontaneous vaginal delivery without incident 23.   S/p  23  Plan for discharge home  with noted uneventful postpartum course. Hospitalist consulted for complaint of chest pain.   Discussed etiology of blood clot including risk factors, treatment with anticoagulation and duration.   Hematology referral placed. Hospitalist will sign off at this time.  Single subsegmental pulmonary embolism without acute cor pulmonale (HCC)     23 - CT CHEST PULMONARY EMBOLISM W CONTRAST revealing: (EPIC)  IMPRESSION:  Trace pulmonary embolism in a subsegmental artery of the posterior segment of the right lower lobe. (EPIC)  Echo obtained: normal systolic and diastolic function, mild to mod tricuspid regurgitation with mildly elevated RVSP of 45 mmHg (EPIC)  Hemodynamics stable without tachycardia, hypoxia, hypotension  Continue enoxaparin 1 mg/kg Q12H SQ on discharge for minimum of 3 months  Note that patient is breastfeeding, in the future may be able to switch to DOAC if no longer breastfeeding  Referral for outpatient Hematology placed  Discussed recommendations with primary team     23 - Met with Gynecologist Office ([facility])   who is here

## 2024-09-05 NOTE — PATIENT INSTRUCTIONS
Patient Information from Today's Visit    Diagnosis:     Follow up for pulmonary embolism  S/p vaginal delivery: 8-28-23  Hx Pulmonary Embolism: 8-30-23     Currently on oral iron      Follow Up Instructions:     Continue oral iron for 1 year    Hematolgoy follow up in 1 year    Go to ER for chest pain, shortness of breath       Treatment Summary has been discussed and given to patient: N/A      Current Labs:   Hospital Outpatient Visit on 09/05/2024   Component Date Value Ref Range Status    Reticulocyte Count,Automated 09/05/2024 2.1 (H)  0.3 - 2.0 % Final    Absolute Retic # 09/05/2024 [id number] (H)  0.026 - 0.095 M/ul Final    Immature Retic Fraction 09/05/2024 12.4  3.0 - 15.9 % Final    Retic Hemoglobin conc. 09/05/2024 29  29 - 35 pg Final    WBC 09/05/2024 8.0  4.3 - 11.1 K/uL Final    RBC 09/05/2024 5.25 (H)  4.05 - 5.2 M/uL Final    Hemoglobin 09/05/2024 12.7  11.7 - 15.4 g/dL Final    Hematocrit 09/05/2024 40.8  35.8 - 46.3 % Final    MCV 09/05/2024 77.7 (L)  82.0 - 102.0 FL Final    MCH 09/05/2024 24.2 (L)  26.1 - 32.9 PG Final    MCHC 09/05/2024 31.1 (L)  31.4 - 35.0 g/dL Final    RDW 09/05/2024 14.5  11.9 - 14.6 % Final    Platelets 09/05/2024 358  150 - 450 K/uL Final    MPV 09/05/2024 9.8  9.4 - 12.3 FL Final    nRBC 09/05/2024 0.00  0.0 - 0.2 K/uL Final    **Note: Absolute NRBC parameter is now reported with Hemogram**    Neutrophils % 09/05/2024 51  43 - 78 % Final    Lymphocytes % 09/05/2024 38  13 - 44 % Final    Monocytes % 09/05/2024 7  4.0 - 12.0 % Final    Eosinophils % 09/05/2024 3  0.5 - 7.8 % Final    Basophils % 09/05/2024 0  0.0 - 2.0 % Final    Immature Granulocytes % 09/05/2024 1  0.0 - 5.0 % Final    Neutrophils Absolute 09/05/2024 4.1  1.7 - 8.2 K/UL Final    Lymphocytes Absolute 09/05/2024 3.0  0.5 - 4.6 K/UL Final    Monocytes Absolute 09/05/2024 0.5  0.1 - 1.3 K/UL Final    Eosinophils Absolute 09/05/2024 0.2  0.0 - 0.8 K/UL Final    Basophils Absolute 09/05/2024 0.0  0.0 - 0.2 K/UL

## 2025-01-19 SDOH — ECONOMIC STABILITY: FOOD INSECURITY: WITHIN THE PAST 12 MONTHS, THE FOOD YOU BOUGHT JUST DIDN'T LAST AND YOU DIDN'T HAVE MONEY TO GET MORE.: PATIENT DECLINED

## 2025-01-19 SDOH — ECONOMIC STABILITY: TRANSPORTATION INSECURITY
IN THE PAST 12 MONTHS, HAS THE LACK OF TRANSPORTATION KEPT YOU FROM MEDICAL APPOINTMENTS OR FROM GETTING MEDICATIONS?: PATIENT DECLINED

## 2025-01-19 SDOH — ECONOMIC STABILITY: TRANSPORTATION INSECURITY
IN THE PAST 12 MONTHS, HAS LACK OF TRANSPORTATION KEPT YOU FROM MEETINGS, WORK, OR FROM GETTING THINGS NEEDED FOR DAILY LIVING?: PATIENT DECLINED

## 2025-01-19 SDOH — ECONOMIC STABILITY: FOOD INSECURITY: WITHIN THE PAST 12 MONTHS, YOU WORRIED THAT YOUR FOOD WOULD RUN OUT BEFORE YOU GOT MONEY TO BUY MORE.: PATIENT DECLINED

## 2025-01-19 SDOH — ECONOMIC STABILITY: INCOME INSECURITY: IN THE LAST 12 MONTHS, WAS THERE A TIME WHEN YOU WERE NOT ABLE TO PAY THE MORTGAGE OR RENT ON TIME?: PATIENT DECLINED

## 2025-01-19 ASSESSMENT — PATIENT HEALTH QUESTIONNAIRE - PHQ9
3. TROUBLE FALLING OR STAYING ASLEEP: SEVERAL DAYS
7. TROUBLE CONCENTRATING ON THINGS, SUCH AS READING THE NEWSPAPER OR WATCHING TELEVISION: SEVERAL DAYS
1. LITTLE INTEREST OR PLEASURE IN DOING THINGS: NEARLY EVERY DAY
8. MOVING OR SPEAKING SO SLOWLY THAT OTHER PEOPLE COULD HAVE NOTICED. OR THE OPPOSITE - BEING SO FIDGETY OR RESTLESS THAT YOU HAVE BEEN MOVING AROUND A LOT MORE THAN USUAL: NOT AT ALL
SUM OF ALL RESPONSES TO PHQ QUESTIONS 1-9: 12
9. THOUGHTS THAT YOU WOULD BE BETTER OFF DEAD, OR OF HURTING YOURSELF: NOT AT ALL
8. MOVING OR SPEAKING SO SLOWLY THAT OTHER PEOPLE COULD HAVE NOTICED. OR THE OPPOSITE, BEING SO FIGETY OR RESTLESS THAT YOU HAVE BEEN MOVING AROUND A LOT MORE THAN USUAL: NOT AT ALL
7. TROUBLE CONCENTRATING ON THINGS, SUCH AS READING THE NEWSPAPER OR WATCHING TELEVISION: SEVERAL DAYS
10. IF YOU CHECKED OFF ANY PROBLEMS, HOW DIFFICULT HAVE THESE PROBLEMS MADE IT FOR YOU TO DO YOUR WORK, TAKE CARE OF THINGS AT HOME, OR GET ALONG WITH OTHER PEOPLE: VERY DIFFICULT
SUM OF ALL RESPONSES TO PHQ9 QUESTIONS 1 & 2: 5
6. FEELING BAD ABOUT YOURSELF - OR THAT YOU ARE A FAILURE OR HAVE LET YOURSELF OR YOUR FAMILY DOWN: SEVERAL DAYS
SUM OF ALL RESPONSES TO PHQ QUESTIONS 1-9: 12
1. LITTLE INTEREST OR PLEASURE IN DOING THINGS: NEARLY EVERY DAY
5. POOR APPETITE OR OVEREATING: SEVERAL DAYS
4. FEELING TIRED OR HAVING LITTLE ENERGY: NEARLY EVERY DAY
2. FEELING DOWN, DEPRESSED OR HOPELESS: MORE THAN HALF THE DAYS
SUM OF ALL RESPONSES TO PHQ QUESTIONS 1-9: 12
9. THOUGHTS THAT YOU WOULD BE BETTER OFF DEAD, OR OF HURTING YOURSELF: NOT AT ALL
3. TROUBLE FALLING OR STAYING ASLEEP: SEVERAL DAYS
SUM OF ALL RESPONSES TO PHQ QUESTIONS 1-9: 12
SUM OF ALL RESPONSES TO PHQ QUESTIONS 1-9: 12
5. POOR APPETITE OR OVEREATING: SEVERAL DAYS
4. FEELING TIRED OR HAVING LITTLE ENERGY: NEARLY EVERY DAY
10. IF YOU CHECKED OFF ANY PROBLEMS, HOW DIFFICULT HAVE THESE PROBLEMS MADE IT FOR YOU TO DO YOUR WORK, TAKE CARE OF THINGS AT HOME, OR GET ALONG WITH OTHER PEOPLE: VERY DIFFICULT
2. FEELING DOWN, DEPRESSED OR HOPELESS: MORE THAN HALF THE DAYS
6. FEELING BAD ABOUT YOURSELF - OR THAT YOU ARE A FAILURE OR HAVE LET YOURSELF OR YOUR FAMILY DOWN: SEVERAL DAYS

## 2025-01-21 ENCOUNTER — OFFICE VISIT (OUTPATIENT)
Dept: FAMILY MEDICINE CLINIC | Facility: CLINIC | Age: 25
End: 2025-01-21
Payer: MEDICAID

## 2025-01-21 VITALS
DIASTOLIC BLOOD PRESSURE: 82 MMHG | OXYGEN SATURATION: 98 % | HEIGHT: 63 IN | WEIGHT: 293 LBS | BODY MASS INDEX: 51.91 KG/M2 | HEART RATE: 105 BPM | SYSTOLIC BLOOD PRESSURE: 118 MMHG

## 2025-01-21 DIAGNOSIS — E66.01 CLASS 3 SEVERE OBESITY DUE TO EXCESS CALORIES WITH SERIOUS COMORBIDITY AND BODY MASS INDEX (BMI) OF 50.0 TO 59.9 IN ADULT: ICD-10-CM

## 2025-01-21 DIAGNOSIS — E66.813 CLASS 3 SEVERE OBESITY DUE TO EXCESS CALORIES WITH SERIOUS COMORBIDITY AND BODY MASS INDEX (BMI) OF 50.0 TO 59.9 IN ADULT: ICD-10-CM

## 2025-01-21 DIAGNOSIS — M54.40 CHRONIC BILATERAL LOW BACK PAIN WITH SCIATICA, SCIATICA LATERALITY UNSPECIFIED: Primary | ICD-10-CM

## 2025-01-21 DIAGNOSIS — G89.29 CHRONIC BILATERAL LOW BACK PAIN WITH SCIATICA, SCIATICA LATERALITY UNSPECIFIED: Primary | ICD-10-CM

## 2025-01-21 DIAGNOSIS — M72.2 PLANTAR FASCIITIS OF LEFT FOOT: ICD-10-CM

## 2025-01-21 PROCEDURE — 99214 OFFICE O/P EST MOD 30 MIN: CPT | Performed by: FAMILY MEDICINE

## 2025-01-21 ASSESSMENT — ENCOUNTER SYMPTOMS: RESPIRATORY NEGATIVE: 1

## 2025-01-21 NOTE — PROGRESS NOTES
-58 PROGRESS NOTE    SUBJECTIVE:   Angeli Gomez is a 24 y.o. female seen for a follow up visit regarding   Chief Complaint   Patient presents with    Back Pain     Complains of lower back pain x 1 year     Foot Pain     Complains of pain in both feet; left worse than right  Pain when walking    Weight Management     Reports unable to lose weight        HPI:  Here today reporting back pain for nearly a year.  Some radiation into the gluteal region at times.  There is been no trauma.  No prior imaging.  Pain is worse when she is laying on her back, better when she is laying on her stomach.  She has been seeing a chiropractor without improvement.  She also reports that she has been having some pain in her left heel, worse in the morning when she gets out of bed where she is been sitting for a while gets up.         Reviewed and updated this visit by provider:           Review of Systems   Respiratory: Negative.     Cardiovascular: Negative.           OBJECTIVE:  Vitals:    01/21/25 1429   BP: 118/82   Site: Left Upper Arm   Cuff Size: Large Adult   Pulse: (!) 105   SpO2: 98%   Weight: (!) 147.8 kg (325 lb 12.8 oz)   Height: 1.59 m (5' 2.6\")        Physical Exam   General: Alert and oriented x3, well-appearing  Neck: No adenopathy, thyromegaly or thyroid nodules  Pulmonary: Normal effort, good airflow, no rales or rhonchi  CVS: Regular rate and rhythm, normal S1, S2, no S3 or S4, no murmurs; no carotid bruits, 2+ pedal pulses  Musculoskeletal: Mild tenderness in the paraspinal muscles of the low back.  Straight leg test is negative, no neuromuscular deficits in lower extremities.  The left heel has some mild tenderness to palpation.    Medical problems and test results were reviewed with the patient today.     No results found for this or any previous visit (from the past 672 hour(s)).    No results found for any visits on 01/21/25.     ASSESSMENT and PLAN    1. Chronic bilateral low back pain with sciatica,

## 2025-01-23 ENCOUNTER — OFFICE VISIT (OUTPATIENT)
Age: 25
End: 2025-01-23
Payer: MEDICAID

## 2025-01-23 DIAGNOSIS — M51.360 DEGENERATION OF INTERVERTEBRAL DISC OF LUMBAR REGION WITH DISCOGENIC BACK PAIN: ICD-10-CM

## 2025-01-23 DIAGNOSIS — E66.01 CLASS 3 SEVERE OBESITY DUE TO EXCESS CALORIES IN ADULT, UNSPECIFIED BMI, UNSPECIFIED WHETHER SERIOUS COMORBIDITY PRESENT: Chronic | ICD-10-CM

## 2025-01-23 DIAGNOSIS — M54.50 LOW BACK PAIN, UNSPECIFIED BACK PAIN LATERALITY, UNSPECIFIED CHRONICITY, UNSPECIFIED WHETHER SCIATICA PRESENT: Primary | ICD-10-CM

## 2025-01-23 DIAGNOSIS — E66.813 CLASS 3 SEVERE OBESITY DUE TO EXCESS CALORIES IN ADULT, UNSPECIFIED BMI, UNSPECIFIED WHETHER SERIOUS COMORBIDITY PRESENT: Chronic | ICD-10-CM

## 2025-01-23 PROCEDURE — 99203 OFFICE O/P NEW LOW 30 MIN: CPT | Performed by: PHYSICIAN ASSISTANT
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
## 2025-02-06 ENCOUNTER — HOSPITAL ENCOUNTER (OUTPATIENT)
Dept: PHYSICAL THERAPY | Age: 25
Setting detail: RECURRING SERIES
Discharge: HOME | End: 2025-02-09
Payer: MEDICAID

## 2025-02-06 DIAGNOSIS — M54.59 OTHER LOW BACK PAIN: Primary | ICD-10-CM

## 2025-02-06 DIAGNOSIS — M51.360 DEGENERATION OF INTERVERTEBRAL DISC OF LUMBAR REGION WITH DISCOGENIC BACK PAIN: ICD-10-CM

## 2025-02-06 PROCEDURE — 97161 PT EVAL LOW COMPLEX 20 MIN: CPT

## 2025-02-06 NOTE — PROGRESS NOTES
Angeli Gomez  : 2000  [place] [employer] [place]  Secondary:  [facility] [place]  [place] [address]  Phone: [phone]  Fax: [phone] Plan Frequency: 1-2x/week for 8 weeks    Plan of Care/Certification Expiration Date: 25        Plan of Care/Certification Expiration Date:  Plan of Care/Certification Expiration Date: 25    Frequency/Duration:   Plan Frequency: 1-2x/week for 8 weeks      Time In/Out:   Time In: 1108  Time Out: 1143      PT Visit Info:         Visit Count:  1    OUTPATIENT PHYSICAL THERAPY:   Treatment Note 2025       Episode  (Lumbar pain)               Treatment Diagnosis:    Other low back pain  Degeneration of intervertebral disc of lumbar region with discogenic back pain  Medical/Referring Diagnosis:    Low back pain, unspecified back pain laterality, unspecified chronicity, unspecified whether sciati*  Degeneration of intervertebral disc of lumbar region with discogenic back pain  Class 3 severe obesity due to excess calories in adult, unspecified BMI, unspecified whether seriou*    Referring Physician:  Nubia Coto PA-C MD Orders:  PT Eval and Treat   Return MD Appt:  TBD   Date of Onset:  insidious onset 1 year ago  Allergies:   Adhesive tape  Restrictions/Precautions:   None      Interventions Planned (Treatment may consist of any combination of the following):     See Assessment Note    Subjective Comments:   See evaluation note  Initial Pain Level:      6-7/10  Post Session Pain Level:       6-7/10  Medications Last Reviewed: 2025  Updated Objective Findings:  See Evaluation Note from today  Treatment   TREATMENT:   THERAPEUTIC ACTIVITY: ( see below for minutes): Therapeutic activities per grid below to improve mobility, strength, balance and coordination. Required minimal visual, verbal, manual and tactile cues to improve independence and safety with daily activities .  THERAPEUTIC

## 2025-02-06 NOTE — THERAPY EVALUATION
Angeli Gomez  : 2000  [place] [employer] [place]  Secondary:  [facility] [place] [address]  Phone: [phone]  Fax: [phone] Plan Frequency: 1-2x/week for 8 weeks  Plan of Care/Certification Expiration Date: 25        Plan of Care/Certification Expiration Date:  Plan of Care/Certification Expiration Date: 25    Frequency/Duration: Plan Frequency: 1-2x/week for 8 weeks      Time In/Out:   Time In: 1108  Time Out: 1143      PT Visit Info:         Visit Count:  1                OUTPATIENT PHYSICAL THERAPY:             Initial Assessment 2025               Episode (Lumbar pain)         Treatment Diagnosis:     Other low back pain  Degeneration of intervertebral disc of lumbar region with discogenic back pain  Medical/Referring Diagnosis:    Low back pain, unspecified back pain laterality, unspecified chronicity, unspecified whether sciati*  Degeneration of intervertebral disc of lumbar region with discogenic back pain  Class 3 severe obesity due to excess calories in adult, unspecified BMI, unspecified whether seriou*  Referring Physician:  Nubia Coto PA-C MD Orders:  PT Eval and Treat   Return MD Appt:  TBD  Date of Onset:    insidious onset 1 year ago.   Allergies:  Adhesive tape  Restrictions/Precautions:    None      Medications Last Reviewed: 2025     SUBJECTIVE   History of Injury/Illness (Reason for Referral):  Patient reports insidious onset of lumbar pain about a year ago. Patient had 2 pregnancies, deliveries and epidurals with last delivery 17 months ago. Patient reports that about 3 months ago she had an episode of severe pain and was unable to move. Patient states that went to the chiropractor for a month that helped a little but still has elevated pain this is affecting mobility and ability to complete daily activities. Patient states that lying on her back on the floor, pressure

## 2025-02-11 ENCOUNTER — APPOINTMENT (OUTPATIENT)
Dept: PHYSICAL THERAPY | Age: 25
End: 2025-02-11
Payer: MEDICAID

## 2025-02-13 ENCOUNTER — APPOINTMENT (OUTPATIENT)
Dept: PHYSICAL THERAPY | Age: 25
End: 2025-02-13
Payer: MEDICAID

## 2025-02-13 ENCOUNTER — HOSPITAL ENCOUNTER (OUTPATIENT)
Dept: PHYSICAL THERAPY | Age: 25
Setting detail: RECURRING SERIES
End: 2025-02-13
Payer: MEDICAID

## 2025-02-18 ENCOUNTER — HOSPITAL ENCOUNTER (OUTPATIENT)
Dept: NUTRITION | Age: 25
Discharge: HOME | End: 2025-02-21
Payer: MEDICAID

## 2025-02-18 ENCOUNTER — APPOINTMENT (OUTPATIENT)
Dept: PHYSICAL THERAPY | Age: 25
End: 2025-02-18
Payer: MEDICAID

## 2025-02-18 ENCOUNTER — HOSPITAL ENCOUNTER (OUTPATIENT)
Dept: PHYSICAL THERAPY | Age: 25
Setting detail: RECURRING SERIES
Discharge: HOME | End: 2025-02-21
Payer: MEDICAID

## 2025-02-18 PROCEDURE — 97802 MEDICAL NUTRITION INDIV IN: CPT

## 2025-02-18 PROCEDURE — 97110 THERAPEUTIC EXERCISES: CPT

## 2025-02-18 NOTE — PROGRESS NOTES
Kayley Melchor MS, RD, LD  Outpatient Registered Dietitian  [facility]  Phone: [phone]  Fax: [phone]  Angeli Dafne Jason, was evaluated through a synchronous (real-time) audio-video encounter. The patient (or guardian if applicable) is aware that this is a billable service, which includes applicable co-pays. This Virtual Visit was conducted with patient's (and/or legal guardian's) consent. Patient identification was verified, and a caregiver was present when appropriate.   The patient was located at Home: [address]  [place] [address]  Provider was located at Facility (Appt Dept): [facility], [address]  [place] [address]  Confirm you are appropriately licensed, registered, or certified to deliver care in the state where the patient is located as indicated above. If you are not or unsure, please re-schedule the visit: Yes, I confirm.   Total time spent for this encounter:  60 minutes.    --Kayley Melchor RD on 2/18/2025 at 3:37 PM  An electronic signature was used to authenticate this note.     Angeli Dafne Jason is a 24 y.o. female referred with the following diagnosis (es): No admission diagnoses are documented for this encounter.     ASSESSMENT  PMH includes MDD, hx cholecystectomy   Labs: reviewed   Meds: none     Patient stated goal: Help with weight loss     Difficulty losing weight, she has been overweight her whole life. She can't keep up with her kids and wants to be healthy for her family. States she has continued to gain weight since second pregnancy but lose weight during first pregnancy.   Complains of never being hungry and getting full easily, reports this has been happening for the past year and doesn't know why.     She eats 1 meal a day (dinner), may have a handful of kids snack in afternoon. She does not often prepare a vegetable with dinner because she is the only one who will eat

## 2025-02-18 NOTE — PROGRESS NOTES
Angeli Gomez  : 2000  [place] [employer] [place]  Secondary:  [facility] [place]  [place] [address]  Phone: [phone]  Fax: [phone] Plan Frequency: 1-2x/week for 8 weeks    Plan of Care/Certification Expiration Date: 25        Plan of Care/Certification Expiration Date:  Plan of Care/Certification Expiration Date: 25    Frequency/Duration:   Plan Frequency: 1-2x/week for 8 weeks      Time In/Out:   Time In: 1146  Time Out: 1230      PT Visit Info:         Visit Count:  2    OUTPATIENT PHYSICAL THERAPY:   Treatment Note 2025       Episode  (Lumbar pain)               Treatment Diagnosis:    Other low back pain  Degeneration of intervertebral disc of lumbar region with discogenic back pain  Medical/Referring Diagnosis:    Low back pain, unspecified back pain laterality, unspecified chronicity, unspecified whether sciati*  Degeneration of intervertebral disc of lumbar region with discogenic back pain  Class 3 severe obesity due to excess calories in adult, unspecified BMI, unspecified whether seriou*    Referring Physician:  Nubia Coto PA-C MD Orders:  PT Eval and Treat   Return MD Appt:  TBD   Date of Onset:  insidious onset 1 year ago  Allergies:   Adhesive tape  Restrictions/Precautions:   None      Interventions Planned (Treatment may consist of any combination of the following):     See Assessment Note    Subjective Comments:   Patient reports that she had to help a family friend do multiple loads of laundry. Patient states that she had back pain up to 10/10 last night.   Initial Pain Level:      \"moderate pain\"/10  Post Session Pain Level:       no increase/10  Medications Last Reviewed: 2025  Updated Objective Findings:  None Today  Treatment   TREATMENT:   THERAPEUTIC ACTIVITY: ( see below for minutes): Therapeutic activities per grid below to improve mobility, strength, balance and coordination.

## 2025-02-21 ENCOUNTER — APPOINTMENT (OUTPATIENT)
Dept: PHYSICAL THERAPY | Age: 25
End: 2025-02-21
Payer: MEDICAID

## 2025-02-25 ENCOUNTER — APPOINTMENT (OUTPATIENT)
Dept: PHYSICAL THERAPY | Age: 25
End: 2025-02-25
Payer: MEDICAID

## 2025-02-27 ENCOUNTER — APPOINTMENT (OUTPATIENT)
Dept: PHYSICAL THERAPY | Age: 25
End: 2025-02-27
Payer: MEDICAID

## 2025-02-28 ENCOUNTER — HOSPITAL ENCOUNTER (OUTPATIENT)
Dept: PHYSICAL THERAPY | Age: 25
Setting detail: RECURRING SERIES
End: 2025-02-28
Payer: MEDICAID

## 2025-02-28 PROCEDURE — 97110 THERAPEUTIC EXERCISES: CPT

## 2025-02-28 NOTE — PROGRESS NOTES
Angeli Gomez  : 2000  [place] [employer] [place]  Secondary:  [facility] [place] [address]  Phone: [phone]  Fax: [phone] Plan Frequency: 1-2x/week for 8 weeks    Plan of Care/Certification Expiration Date: 25        Plan of Care/Certification Expiration Date:  Plan of Care/Certification Expiration Date: 25    Frequency/Duration:   Plan Frequency: 1-2x/week for 8 weeks      Time In/Out:   Time In: 1145  Time Out: 1210      PT Visit Info:         Visit Count:  3    OUTPATIENT PHYSICAL THERAPY:   Treatment Note 2025       Episode  (Lumbar pain)               Treatment Diagnosis:    Other low back pain  Degeneration of intervertebral disc of lumbar region with discogenic back pain  Medical/Referring Diagnosis:    Low back pain, unspecified back pain laterality, unspecified chronicity, unspecified whether sciati*  Degeneration of intervertebral disc of lumbar region with discogenic back pain  Class 3 severe obesity due to excess calories in adult, unspecified BMI, unspecified whether seriou*    Referring Physician:  Nubia Coto PA-C MD Orders:  PT Eval and Treat   Return MD Appt:  TBD   Date of Onset:  insidious onset 1 year ago  Allergies:   Adhesive tape  Restrictions/Precautions:   None      Interventions Planned (Treatment may consist of any combination of the following):     See Assessment Note    Subjective Comments:   Patient reports her back has felt better overall with less pain picking up tings from the floor. Patient states that she and her children have been sick this past week.   Initial Pain Level:      \"mild pain\"/10  Post Session Pain Level:       no increase/10  Medications Last Reviewed: 2025  Updated Objective Findings:  None Today  Treatment   TREATMENT:   THERAPEUTIC ACTIVITY: ( see below for minutes): Therapeutic activities per grid below to improve mobility, strength, balance  strength of 5/5 to stand and walk with less pain.  Patient will be able to stand at least 20 minutes with max 2/10 pain.  Discharge Goals: Time Frame: 8 weeks  Patient will be able to lay on her back for 30 minutes without pain.  Patient will have mod. Oswestry score of 20 or less indicating improved function.  Patient will have lumbar flexion of 60 degrees to be able to  items from the floor.        Outcome Measure:   Tool Used: Modified Oswestry Low Back Pain Questionnaire  Score:  Initial: 30/50  Most Recent: X/50 (Date: -- )   Interpretation of Score: Each section is scored on a 0-5 scale, 5 representing the greatest disability.  The scores of each section are added together for a total score of 50.    Treatment/Session Summary:    Treatment Assessment:   Patient has had less pain with daily activities since beginning PT. Patient is recovering from illness and completed an abbreviated session today. Plan to progress strengthening activities at next visit.   Communication/Consultation:  None today  Equipment provided today:  None   Recommendations/Intent for next treatment session: Next visit will focus on restoring strength, ROM, posture control and function.    >Total Treatment Billable Duration:  25 minutes   Time In: 1145  Time Out: 1210     Dilan Kang PT         Charge Capture  Events  [employer] Portal  Appt Desk  Attendance Report     Future Appointments   Date Time Provider Department Center   3/4/2025 11:45 AM Dilan Kang, PT [facility]   3/6/2025 11:45 AM Silvina Salas, PTA [facility]   3/6/2025  2:30 PM Kayley Melchor, RD [facility]   3/11/2025 11:45 AM Dilan Kang, PT [facility]   3/13/2025 11:45 AM Silvina Salas, PTA [facility]   3/25/2025 11:45 AM Dilan Kang, PT [facility]   3/28/2025 11:45 AM Silvina Salas, PTA [facility]   9/15/2025  3:30 PM [facility]   9/16/2025  2:30 PM Angela Burch MD [facility]

## 2025-03-04 ENCOUNTER — HOSPITAL ENCOUNTER (OUTPATIENT)
Dept: PHYSICAL THERAPY | Age: 25
Setting detail: RECURRING SERIES
Discharge: HOME | End: 2025-03-07
Payer: MEDICAID

## 2025-03-04 PROCEDURE — 97110 THERAPEUTIC EXERCISES: CPT

## 2025-03-04 NOTE — PROGRESS NOTES
Dilan, PT [facility] O   3/28/2025 11:45 AM Silvina Salas, LIANNA [facility]   9/15/2025  3:30 PM [facility]   9/16/2025  2:30 PM Angela Burch MD [facility]

## 2025-03-06 ENCOUNTER — HOSPITAL ENCOUNTER (OUTPATIENT)
Dept: PHYSICAL THERAPY | Age: 25
Setting detail: RECURRING SERIES
Discharge: HOME | End: 2025-03-09
Payer: MEDICAID

## 2025-03-06 PROCEDURE — 97110 THERAPEUTIC EXERCISES: CPT

## 2025-03-06 NOTE — PROGRESS NOTES
Angeli Gomez  : 2000  [place] [employer] [place]  Secondary:  [facility] [place]  [place] [address]  Phone: [phone]  Fax: [phone] Plan Frequency: 1-2x/week for 8 weeks    Plan of Care/Certification Expiration Date: 25        Plan of Care/Certification Expiration Date:  Plan of Care/Certification Expiration Date: 25    Frequency/Duration:   Plan Frequency: 1-2x/week for 8 weeks      Time In/Out:   Time In: 1200  Time Out: 1230      PT Visit Info:         Visit Count:  5    OUTPATIENT PHYSICAL THERAPY:   Treatment Note 3/6/2025       Episode  (Lumbar pain)               Treatment Diagnosis:    Other low back pain  Degeneration of intervertebral disc of lumbar region with discogenic back pain  Medical/Referring Diagnosis:    Low back pain, unspecified back pain laterality, unspecified chronicity, unspecified whether sciati*  Degeneration of intervertebral disc of lumbar region with discogenic back pain  Class 3 severe obesity due to excess calories in adult, unspecified BMI, unspecified whether seriou*    Referring Physician:  Nubia Coto PA-C MD Orders:  PT Eval and Treat   Return MD Appt:  TBD   Date of Onset:  insidious onset 1 year ago  Allergies:   Adhesive tape  Restrictions/Precautions:   None      Interventions Planned (Treatment may consist of any combination of the following):     See Assessment Note    Subjective Comments:   Patient reports no pain today.  Initial Pain Level:      0/10  Post Session Pain Level:       no increase/10  Medications Last Reviewed: 3/6/2025  Updated Objective Findings:  None Today  Treatment   TREATMENT:   THERAPEUTIC ACTIVITY: ( see below for minutes): Therapeutic activities per grid below to improve mobility, strength, balance and coordination. Required minimal visual, verbal, manual and tactile cues to improve independence and safety with daily activities .  THERAPEUTIC

## 2025-03-11 ENCOUNTER — HOSPITAL ENCOUNTER (OUTPATIENT)
Dept: PHYSICAL THERAPY | Age: 25
Setting detail: RECURRING SERIES
Discharge: HOME | End: 2025-03-14
Payer: MEDICAID

## 2025-03-11 PROCEDURE — 97110 THERAPEUTIC EXERCISES: CPT

## 2025-03-11 NOTE — PROGRESS NOTES
Angeli Gomez  : 2000  [place]: [employer] [place])  Secondary:  [facility] @ [place]  [place] [address]  Phone: [phone]  Fax: [phone] Plan Frequency: 1-2x/week for 8 weeks    Plan of Care/Certification Expiration Date: 25        Plan of Care/Certification Expiration Date:  Plan of Care/Certification Expiration Date: 25    Frequency/Duration:   Plan Frequency: 1-2x/week for 8 weeks      Time In/Out:   Time In: 1148  Time Out: 1230      PT Visit Info:         Visit Count:  6    OUTPATIENT PHYSICAL THERAPY:   Treatment Note 3/11/2025       Episode  (Lumbar pain)               Treatment Diagnosis:    Other low back pain  Degeneration of intervertebral disc of lumbar region with discogenic back pain  Medical/Referring Diagnosis:    Low back pain, unspecified back pain laterality, unspecified chronicity, unspecified whether sciati*  Degeneration of intervertebral disc of lumbar region with discogenic back pain  Class 3 severe obesity due to excess calories in adult, unspecified BMI, unspecified whether seriou*    Referring Physician:  Nubia Coto PA-C MD Orders:  PT Eval and Treat   Return MD Appt:  TBD   Date of Onset:  insidious onset 1 year ago  Allergies:   Adhesive tape  Restrictions/Precautions:   None      Interventions Planned (Treatment may consist of any combination of the following):     See Assessment Note    Subjective Comments:   Patient reports she has had increased back pain since last visit and after riding in the car to/from [place].   Initial Pain Level:      7/10  Post Session Pain Level:       no increase/10  Medications Last Reviewed: 3/11/2025  Updated Objective Findings:  None Today  Treatment   TREATMENT:   THERAPEUTIC ACTIVITY: ( see below for minutes): Therapeutic activities per grid below to improve mobility, strength, balance and coordination. Required minimal visual, verbal, manual and tactile

## 2025-03-13 ENCOUNTER — HOSPITAL ENCOUNTER (OUTPATIENT)
Dept: PHYSICAL THERAPY | Age: 25
Setting detail: RECURRING SERIES
Discharge: HOME | End: 2025-03-16
Payer: MEDICAID

## 2025-03-13 PROCEDURE — 97110 THERAPEUTIC EXERCISES: CPT

## 2025-03-13 NOTE — PROGRESS NOTES
Angeli Gomez  : 2000  [place] [employer] [place]  Secondary:  [facility] @ [place]  [place] [address]  Phone: [phone]  Fax: [phone] Plan Frequency: 1-2x/week for 8 weeks    Plan of Care/Certification Expiration Date: 25        Plan of Care/Certification Expiration Date:  Plan of Care/Certification Expiration Date: 25    Frequency/Duration:   Plan Frequency: 1-2x/week for 8 weeks      Time In/Out:   Time In: 1150  Time Out: 1230      PT Visit Info:         Visit Count:  7    OUTPATIENT PHYSICAL THERAPY:   Treatment Note 3/13/2025       Episode  (Lumbar pain)               Treatment Diagnosis:    Other low back pain  Degeneration of intervertebral disc of lumbar region with discogenic back pain  Medical/Referring Diagnosis:    Low back pain, unspecified back pain laterality, unspecified chronicity, unspecified whether sciati*  Degeneration of intervertebral disc of lumbar region with discogenic back pain  Class 3 severe obesity due to excess calories in adult, unspecified BMI, unspecified whether seriou*    Referring Physician:  Nubia Coto PA-C MD Orders:  PT Eval and Treat   Return MD Appt:  TBD   Date of Onset:  insidious onset 1 year ago  Allergies:   Adhesive tape  Restrictions/Precautions:   None      Interventions Planned (Treatment may consist of any combination of the following):     See Assessment Note    Subjective Comments:   Patient reports moderate back pain but she had to take her  to the ER last night.   Initial Pain Level:     moderate lumbar pain  Post Session Pain Level:       no increase/10  Medications Last Reviewed: 3/13/2025  Updated Objective Findings:  None Today  Treatment   TREATMENT:   THERAPEUTIC ACTIVITY: ( see below for minutes): Therapeutic activities per grid below to improve mobility, strength, balance and coordination. Required minimal visual, verbal, manual and tactile cues

## 2025-03-25 ENCOUNTER — HOSPITAL ENCOUNTER (OUTPATIENT)
Dept: PHYSICAL THERAPY | Age: 25
Setting detail: RECURRING SERIES
Discharge: HOME | End: 2025-03-28
Payer: MEDICAID

## 2025-03-25 PROCEDURE — 97110 THERAPEUTIC EXERCISES: CPT

## 2025-03-25 NOTE — PROGRESS NOTES
Angeli Gomez  : 2000  [place] [employer] [place]  Secondary:  [facility] @ [place]  [place] [address]  Phone: [phone]  Fax: [phone] Plan Frequency: 1-2x/week for 8 weeks    Plan of Care/Certification Expiration Date: 25        Plan of Care/Certification Expiration Date:  Plan of Care/Certification Expiration Date: 25    Frequency/Duration:   Plan Frequency: 1-2x/week for 8 weeks      Time In/Out:   Time In: 1150  Time Out: 1230      PT Visit Info:         Visit Count:  8    OUTPATIENT PHYSICAL THERAPY:   Treatment Note and Progress Note 3/25/2025       Episode  (Lumbar pain)               Treatment Diagnosis:    Other low back pain  Degeneration of intervertebral disc of lumbar region with discogenic back pain  Medical/Referring Diagnosis:    Low back pain, unspecified back pain laterality, unspecified chronicity, unspecified whether sciati*  Degeneration of intervertebral disc of lumbar region with discogenic back pain  Class 3 severe obesity due to excess calories in adult, unspecified BMI, unspecified whether seriou*    Referring Physician:  Nubia Ctoo PA-C MD Orders:  PT Eval and Treat   Return MD Appt:  TBD   Date of Onset:  insidious onset 1 year ago  Allergies:   Adhesive tape  Restrictions/Precautions:   None      Interventions Planned (Treatment may consist of any combination of the following):     See Assessment Note    Subjective Comments:   Patient states \"I'm sore all over and we had COVID.\"  Initial Pain Level:   \"it's bearable\"  Post Session Pain Level:       no increase/10  Medications Last Reviewed: 3/25/2025  Updated Objective Findings:  lumbar flexion, extension and B lateral flexion pain free range has improved. Pt demonstrated in standing.  Treatment   TREATMENT:   THERAPEUTIC ACTIVITY: ( see below for minutes): Therapeutic activities per grid below to improve mobility, strength, balance and

## 2025-03-28 ENCOUNTER — APPOINTMENT (OUTPATIENT)
Dept: PHYSICAL THERAPY | Age: 25
End: 2025-03-28
Payer: MEDICAID

## 2025-04-01 ENCOUNTER — HOSPITAL ENCOUNTER (OUTPATIENT)
Dept: PHYSICAL THERAPY | Age: 25
Setting detail: RECURRING SERIES
End: 2025-04-01
Payer: MEDICAID

## 2025-04-03 ENCOUNTER — HOSPITAL ENCOUNTER (OUTPATIENT)
Dept: PHYSICAL THERAPY | Age: 25
Setting detail: RECURRING SERIES
Discharge: HOME | End: 2025-04-06
Payer: MEDICAID

## 2025-04-03 PROCEDURE — 97110 THERAPEUTIC EXERCISES: CPT

## 2025-04-03 NOTE — PROGRESS NOTES
musculature.  Communication/Consultation:  None today  Equipment provided today:  None   Recommendations/Intent for next treatment session: Next visit will focus on restoring strength, ROM, posture control and function.    >Total Treatment Billable Duration: 42 minutes   Time In: 1145  Time Out: 1228     Dilan Kang PT         Charge Capture  Events  [employer] Portal  Appt Desk  Attendance Report     Future Appointments   Date Time Provider Department Center   4/8/2025 12:30 PM Dilan Kang, PT [facility]   4/10/2025 11:45 AM Silvina Salas, PTA [facility]   4/15/2025 11:45 AM Dilan Kang, PT [facility]   4/17/2025 11:45 AM Dilan Kang, PT [facility]   4/22/2025 11:45 AM Dilan Kang, PT [facility]   4/24/2025 11:00 AM Dilan Kang, PT [facility]   4/28/2025 11:45 AM Silvina Salas, PTA [facility]   4/30/2025 11:45 AM Silvina Salas, PTA [facility]   9/15/2025  3:30 PM [facility]   9/16/2025  2:30 PM Angela Burch MD [facility]

## 2025-04-08 ENCOUNTER — HOSPITAL ENCOUNTER (OUTPATIENT)
Dept: PHYSICAL THERAPY | Age: 25
Setting detail: RECURRING SERIES
Discharge: HOME | End: 2025-04-11
Payer: MEDICAID

## 2025-04-08 PROCEDURE — 97110 THERAPEUTIC EXERCISES: CPT

## 2025-04-08 NOTE — PROGRESS NOTES
Angeli Gomez  : 2000  [place]: [employer] [place]Med[place])  Secondary:  [facility] [place] [address]  Phone: [phone]  Fax: [phone] Plan Frequency: 1-2x/week for 8 weeks    Plan of Care/Certification Expiration Date: 25        Plan of Care/Certification Expiration Date:  Plan of Care/Certification Expiration Date: 25    Frequency/Duration:   Plan Frequency: 1-2x/week for 8 weeks      Time In/Out:   Time In: 1245  Time Out: 1313      PT Visit Info:         Visit Count:  10    OUTPATIENT PHYSICAL THERAPY:   Treatment Note  2025       Episode  (Lumbar pain)               Treatment Diagnosis:    Other low back pain  Degeneration of intervertebral disc of lumbar region with discogenic back pain  Medical/Referring Diagnosis:    Low back pain, unspecified back pain laterality, unspecified chronicity, unspecified whether sciati*  Degeneration of intervertebral disc of lumbar region with discogenic back pain  Class 3 severe obesity due to excess calories in adult, unspecified BMI, unspecified whether seriou*    Referring Physician:  Nubia Coto PA-C MD Orders:  PT Eval and Treat   Return MD Appt:  TBD   Date of Onset:  insidious onset 1 year ago  Allergies:   Adhesive tape  Restrictions/Precautions:   None      Interventions Planned (Treatment may consist of any combination of the following):     See Assessment Note    Subjective Comments:   Patient states her knees have continued to hurt and worse with descending>ascending stairs. Patient states that her back is feeling better overall.  Initial Pain Level:   \"my knees hurt\"  Post Session Pain Level:       no increase/10  Medications Last Reviewed: 2025  Updated Objective Findings:  lumbar flexion, extension and B lateral flexion pain free range has improved. Pt demonstrated in standing.  Treatment   TREATMENT:   THERAPEUTIC ACTIVITY: ( see below for minutes):

## 2025-04-16 ENCOUNTER — HOSPITAL ENCOUNTER (OUTPATIENT)
Dept: PHYSICAL THERAPY | Age: 25
Setting detail: RECURRING SERIES
Discharge: HOME | End: 2025-04-19
Payer: MEDICAID

## 2025-04-16 PROCEDURE — 97110 THERAPEUTIC EXERCISES: CPT

## 2025-04-16 NOTE — PROGRESS NOTES
Angeli Gomez  : 2000  [place]: [employer] [place]Med[place])  Secondary:  [facility] [place] [address]  Phone: [phone]  Fax: [phone] Plan Frequency: 1-2x/week for 8 weeks    Plan of Care/Certification Expiration Date: 25        Plan of Care/Certification Expiration Date:  Plan of Care/Certification Expiration Date: 25    Frequency/Duration:   Plan Frequency: 1-2x/week for 8 weeks      Time In/Out:   Time In: 1021  Time Out: 1100      PT Visit Info:         Visit Count:  11    OUTPATIENT PHYSICAL THERAPY:   Treatment Note  2025       Episode  (Lumbar pain)               Treatment Diagnosis:    Other low back pain  Degeneration of intervertebral disc of lumbar region with discogenic back pain  Medical/Referring Diagnosis:    Low back pain, unspecified back pain laterality, unspecified chronicity, unspecified whether sciati*  Degeneration of intervertebral disc of lumbar region with discogenic back pain  Class 3 severe obesity due to excess calories in adult, unspecified BMI, unspecified whether seriou*    Referring Physician:  Nubia Coto PA-C MD Orders:  PT Eval and Treat   Return MD Appt:  TBD   Date of Onset:  insidious onset 1 year ago  Allergies:   Adhesive tape  Restrictions/Precautions:   None      Interventions Planned (Treatment may consist of any combination of the following):     See Assessment Note    Subjective Comments:   Patient states she is having some pain today in her back and knees.  Initial Pain Level:   \"my knees hurt\"  Post Session Pain Level:       no increase/10  Medications Last Reviewed: 2025  Updated Objective Findings:  lumbar flexion, extension and B lateral flexion pain free range has improved. Pt demonstrated in standing.  Treatment   TREATMENT:   THERAPEUTIC ACTIVITY: ( see below for minutes): Therapeutic activities per grid below to improve mobility, strength, balance and

## 2025-04-17 ENCOUNTER — HOSPITAL ENCOUNTER (OUTPATIENT)
Dept: PHYSICAL THERAPY | Age: 25
Setting detail: RECURRING SERIES
Discharge: HOME | End: 2025-04-20
Payer: MEDICAID

## 2025-04-17 PROCEDURE — 97110 THERAPEUTIC EXERCISES: CPT

## 2025-04-17 NOTE — PROGRESS NOTES
Angeli Gomez  : 2000  [place] [employer] [place])  Secondary:  [facility] @ [place] [address]  Phone: [phone]  Fax: [phone] Plan Frequency: 1-2x/week for 8 weeks    Plan of Care/Certification Expiration Date: 25        Plan of Care/Certification Expiration Date:  Plan of Care/Certification Expiration Date: 25    Frequency/Duration:   Plan Frequency: 1-2x/week for 8 weeks      Time In/Out:   Time In: 1148  Time Out: 1230      PT Visit Info:         Visit Count:  12    OUTPATIENT PHYSICAL THERAPY:   Treatment Note  2025       Episode  (Lumbar pain)               Treatment Diagnosis:    Other low back pain  Degeneration of intervertebral disc of lumbar region with discogenic back pain  Medical/Referring Diagnosis:    Low back pain, unspecified back pain laterality, unspecified chronicity, unspecified whether sciati*  Degeneration of intervertebral disc of lumbar region with discogenic back pain  Class 3 severe obesity due to excess calories in adult, unspecified BMI, unspecified whether seriou*    Referring Physician:  Nubia Coto PA-C MD Orders:  PT Eval and Treat   Return MD Appt:  TBD   Date of Onset:  insidious onset 1 year ago  Allergies:   Adhesive tape  Restrictions/Precautions:   None      Interventions Planned (Treatment may consist of any combination of the following):     See Assessment Note    Subjective Comments:   Patient states she is having a little less pain today.  Initial Pain Level:   \"my knees hurt more than my back\"  Post Session Pain Level:       no increase/10  Medications Last Reviewed: 2025  Updated Objective Findings:  lumbar flexion, extension and B lateral flexion pain free range has improved. Pt demonstrated in standing.  Treatment   TREATMENT:   THERAPEUTIC ACTIVITY: ( see below for minutes): Therapeutic activities per grid below to improve mobility, strength, balance

## 2025-04-21 ENCOUNTER — OFFICE VISIT (OUTPATIENT)
Dept: SURGERY | Age: 25
End: 2025-04-21
Payer: MEDICAID

## 2025-04-21 VITALS
HEART RATE: 74 BPM | BODY MASS INDEX: 51.91 KG/M2 | DIASTOLIC BLOOD PRESSURE: 81 MMHG | HEIGHT: 63 IN | SYSTOLIC BLOOD PRESSURE: 156 MMHG | WEIGHT: 293 LBS

## 2025-04-21 DIAGNOSIS — R73.01 IMPAIRED FASTING GLUCOSE: Primary | ICD-10-CM

## 2025-04-21 DIAGNOSIS — Z71.3 NUTRITIONAL COUNSELING: ICD-10-CM

## 2025-04-21 DIAGNOSIS — F33.9 RECURRENT MAJOR DEPRESSIVE DISORDER, REMISSION STATUS UNSPECIFIED: ICD-10-CM

## 2025-04-21 DIAGNOSIS — I26.93 SINGLE SUBSEGMENTAL PULMONARY EMBOLISM WITHOUT ACUTE COR PULMONALE (HCC): ICD-10-CM

## 2025-04-21 DIAGNOSIS — E66.813 CLASS 3 SEVERE OBESITY DUE TO EXCESS CALORIES WITH BODY MASS INDEX (BMI) OF 50.0 TO 59.9 IN ADULT (HCC): ICD-10-CM

## 2025-04-21 DIAGNOSIS — Z71.82 EXERCISE COUNSELING: ICD-10-CM

## 2025-04-21 DIAGNOSIS — E65 ABDOMINAL OBESITY: ICD-10-CM

## 2025-04-21 PROCEDURE — 99205 OFFICE O/P NEW HI 60 MIN: CPT | Performed by: PHYSICIAN ASSISTANT

## 2025-04-21 RX ORDER — CETIRIZINE HYDROCHLORIDE 10 MG/1
10 TABLET ORAL DAILY
COMMUNITY
Start: 2025-03-18

## 2025-04-21 NOTE — PROGRESS NOTES
JATINDER Gaming                 Comprehensive Medical & Surgical Weight Loss       Date of visit: 4/21/2025      History and Physical    Patient: Angeli Gomez MRN: [id number]     YOB: 2000  Age: 25 y.o.  Sex: female      Angeli Gomez  who presents to the [facility] for consultation to assist in weight loss.  This is her initial consultation preparing her for our multi-disciplinary weight loss program.  She presents with a height of 1.6 m (5' 3\") and weight of (!) 145.6 kg (321 lb), giving her a Body mass index is 56.86 kg/m².  She has an ideal body weight of 141 lbs, and excess body weight of 180 lbs.      Last Non-Surgical Weight Loss:      4/21/2025     1:55 PM   Non-Surgical Weight Loss Tracker   Consult Date 4/21/2025   Initial Height 5' 3\"   Initial Weight 321 lbs   Ideal Body Weight 141 lb   Initial BMI 56.9   Initial  lb   Is patient taking anti-obesity Meds? No        Initial Neck circumference - 16.25\"  Initial Waist circumference - 52\"  Initial Hip Circumference - 60\"    Lowest weight 200 lbs  Highest weight 330 lbs  Biggest challenge to weight loss - 1-2 meals per day, limited exercise    MEDICAL HISTORY:  Morbid Obesity   Depression  Anxiety  Hx of SI  Back pain  Iron deficiency  Hx of PE  Hx of umbilical hernia repair    Comorbidity Yes or No   Hypertension No   Hyperlipidemia No   Diabetes Mellitus  Insulin dependent = No  Last A1c = N/A No   Coronary Artery Disease No   Gastroesophageal Reflux No   Obstructive Sleep Apnea No   Cancer No   Asthma No   Osteoarthritis No   Joint Pain Yes      Admits to rare GERD symptoms including (+) heartburn, (-) regurgitation, (-) dysphagia.    Denies current medication regimen.  Denies previous testing including EGD.     Other Yes or No   Migraines No   Seizures or Epilepsy No   Glaucoma No   Hyperthyroidism No   Irregular Heartbeat or palpitations No   Gastroparesis No   History of gallstones or pancreatitis Yes,

## 2025-04-22 ENCOUNTER — HOSPITAL ENCOUNTER (OUTPATIENT)
Dept: PHYSICAL THERAPY | Age: 25
Setting detail: RECURRING SERIES
Discharge: HOME | End: 2025-04-25
Payer: MEDICAID

## 2025-04-22 PROCEDURE — 97110 THERAPEUTIC EXERCISES: CPT

## 2025-04-22 NOTE — PROGRESS NOTES
Angeli Gomez  : 2000  [place]: [employer] [place]  Secondary:  [facility] @ [place] [address]  Phone: [phone]  Fax: [phone] Plan Frequency: 1-2x/week for 8 weeks    Plan of Care/Certification Expiration Date: 25        Plan of Care/Certification Expiration Date:  Plan of Care/Certification Expiration Date: 25    Frequency/Duration:   Plan Frequency: 1-2x/week for 8 weeks      Time In/Out:   Time In: 1156  Time Out: 1226      PT Visit Info:         Visit Count:  13    OUTPATIENT PHYSICAL THERAPY:   Treatment Note  2025       Episode  (Lumbar pain)               Treatment Diagnosis:    Other low back pain  Degeneration of intervertebral disc of lumbar region with discogenic back pain  Medical/Referring Diagnosis:    Low back pain, unspecified back pain laterality, unspecified chronicity, unspecified whether sciati*  Degeneration of intervertebral disc of lumbar region with discogenic back pain  Class 3 severe obesity due to excess calories in adult, unspecified BMI, unspecified whether seriou*    Referring Physician:  Nubia Coto PA-C MD Orders:  PT Eval and Treat   Return MD Appt:  TBD   Date of Onset:  insidious onset 1 year ago  Allergies:   Adhesive tape  Restrictions/Precautions:   None      Interventions Planned (Treatment may consist of any combination of the following):     See Assessment Note    Subjective Comments:   Patient states her back is more sore and stiffness after sleeping on the couch last night.   Initial Pain Level:   \"my R knee is hurting more and my back is sore\"  Post Session Pain Level:       no increase/10  Medications Last Reviewed: 2025  Updated Objective Findings:  lumbar flexion, extension and B lateral flexion pain free range has improved. Pt demonstrated in standing.  Treatment   TREATMENT:   THERAPEUTIC ACTIVITY: ( see below for minutes): Therapeutic activities per

## 2025-04-24 ENCOUNTER — OFFICE VISIT (OUTPATIENT)
Dept: FAMILY MEDICINE CLINIC | Facility: CLINIC | Age: 25
End: 2025-04-24
Payer: MEDICAID

## 2025-04-24 ENCOUNTER — APPOINTMENT (OUTPATIENT)
Dept: PHYSICAL THERAPY | Age: 25
End: 2025-04-24
Payer: MEDICAID

## 2025-04-24 VITALS
OXYGEN SATURATION: 99 % | HEIGHT: 63 IN | WEIGHT: 293 LBS | BODY MASS INDEX: 51.91 KG/M2 | HEART RATE: 100 BPM | DIASTOLIC BLOOD PRESSURE: 74 MMHG | RESPIRATION RATE: 16 BRPM | SYSTOLIC BLOOD PRESSURE: 116 MMHG

## 2025-04-24 DIAGNOSIS — E66.813 CLASS 3 SEVERE OBESITY DUE TO EXCESS CALORIES WITH SERIOUS COMORBIDITY AND BODY MASS INDEX (BMI) OF 50.0 TO 59.9 IN ADULT (HCC): Primary | ICD-10-CM

## 2025-04-24 PROCEDURE — 99214 OFFICE O/P EST MOD 30 MIN: CPT | Performed by: PHYSICIAN ASSISTANT

## 2025-04-24 PROCEDURE — 93000 ELECTROCARDIOGRAM COMPLETE: CPT | Performed by: PHYSICIAN ASSISTANT

## 2025-04-24 NOTE — PROGRESS NOTES
PROGRESS NOTE    SUBJECTIVE:   Angeli Gomez is a 25 y.o. female seen for a follow up visit regarding   Chief Complaint   Patient presents with    Other     Needs an EKG for bariatric clinic        HPI:  HPI    Pt presents for OV and EKG/labs for bariatrics. Initial appt was 4/21/25 and will see nutritionist there 5/5/25. Going to try Rx therapy first.     Reviewed and updated this visit by provider:           Review of Systems   Constitutional:  Negative for unexpected weight change.          OBJECTIVE:  Vitals:    04/24/25 0851   BP: 116/74   BP Site: Left Upper Arm   Patient Position: Sitting   BP Cuff Size: Large Adult   Pulse: 100   Resp: 16   SpO2: 99%   Weight: (!) 146.8 kg (323 lb 9.6 oz)   Height: 1.6 m (5' 3\")        Physical Exam  Constitutional:       General: She is not in acute distress.     Appearance: She is not ill-appearing or toxic-appearing.   HENT:      Head: Normocephalic and atraumatic.   Eyes:      Extraocular Movements: Extraocular movements intact.      Conjunctiva/sclera: Conjunctivae normal.      Pupils: Pupils are equal, round, and reactive to light.   Cardiovascular:      Rate and Rhythm: Normal rate and regular rhythm.      Heart sounds: No murmur heard.  Pulmonary:      Effort: Pulmonary effort is normal.      Breath sounds: Normal breath sounds. No wheezing, rhonchi or rales.   Musculoskeletal:         General: No swelling or deformity.   Skin:     General: Skin is warm and dry.   Neurological:      General: No focal deficit present.      Mental Status: She is alert and oriented to person, place, and time.      Cranial Nerves: No cranial nerve deficit or facial asymmetry.      Motor: No weakness.   Psychiatric:         Mood and Affect: Mood normal.         Behavior: Behavior normal.         Judgment: Judgment normal.          Medical problems and test results were reviewed with the patient today.     No results found for this or any previous visit (from the past 4

## 2025-04-25 ENCOUNTER — TELEPHONE (OUTPATIENT)
Dept: FAMILY MEDICINE CLINIC | Facility: CLINIC | Age: 25
End: 2025-04-25

## 2025-04-25 ENCOUNTER — APPOINTMENT (OUTPATIENT)
Dept: PHYSICAL THERAPY | Age: 25
End: 2025-04-25
Payer: MEDICAID

## 2025-04-25 DIAGNOSIS — E66.813 CLASS 3 SEVERE OBESITY DUE TO EXCESS CALORIES WITH SERIOUS COMORBIDITY AND BODY MASS INDEX (BMI) OF 50.0 TO 59.9 IN ADULT (HCC): ICD-10-CM

## 2025-04-25 LAB
25(OH)D3 SERPL-MCNC: 23 NG/ML (ref 30–100)
ALBUMIN SERPL-MCNC: 3.3 G/DL (ref 3.5–5)
ALBUMIN/GLOB SERPL: 1.1 (ref 1–1.9)
ALP SERPL-CCNC: 100 U/L (ref 35–104)
ALT SERPL-CCNC: 29 U/L (ref 8–45)
ANION GAP SERPL CALC-SCNC: 10 MMOL/L (ref 7–16)
AST SERPL-CCNC: 22 U/L (ref 15–37)
BASOPHILS # BLD: 0.05 K/UL (ref 0–0.2)
BASOPHILS NFR BLD: 0.8 % (ref 0–2)
BILIRUB DIRECT SERPL-MCNC: 0.2 MG/DL (ref 0–0.3)
BILIRUB SERPL-MCNC: 0.4 MG/DL (ref 0–1.2)
BUN SERPL-MCNC: 10 MG/DL (ref 6–23)
CALCIUM SERPL-MCNC: 8.9 MG/DL (ref 8.8–10.2)
CHLORIDE SERPL-SCNC: 103 MMOL/L (ref 98–107)
CHOLEST SERPL-MCNC: 141 MG/DL (ref 0–200)
CO2 SERPL-SCNC: 24 MMOL/L (ref 20–29)
CREAT SERPL-MCNC: 0.77 MG/DL (ref 0.6–1.1)
DIFFERENTIAL METHOD BLD: ABNORMAL
EOSINOPHIL # BLD: 0.23 K/UL (ref 0–0.8)
EOSINOPHIL NFR BLD: 3.6 % (ref 0.5–7.8)
ERYTHROCYTE [DISTWIDTH] IN BLOOD BY AUTOMATED COUNT: 15.7 % (ref 11.9–14.6)
EST. AVERAGE GLUCOSE BLD GHB EST-MCNC: 114 MG/DL
GLOBULIN SER CALC-MCNC: 3.1 G/DL (ref 2.3–3.5)
GLUCOSE SERPL-MCNC: 85 MG/DL (ref 70–99)
HBA1C MFR BLD: 5.6 % (ref 0–5.6)
HCT VFR BLD AUTO: 42 % (ref 35.8–46.3)
HDLC SERPL-MCNC: 32 MG/DL (ref 40–60)
HDLC SERPL: 4.5 (ref 0–5)
HGB BLD-MCNC: 12.7 G/DL (ref 11.7–15.4)
IMM GRANULOCYTES # BLD AUTO: 0.01 K/UL (ref 0–0.5)
IMM GRANULOCYTES NFR BLD AUTO: 0.2 % (ref 0–5)
LDLC SERPL CALC-MCNC: 90 MG/DL (ref 0–100)
LYMPHOCYTES # BLD: 2.59 K/UL (ref 0.5–4.6)
LYMPHOCYTES NFR BLD: 40.2 % (ref 13–44)
MCH RBC QN AUTO: 23.7 PG (ref 26.1–32.9)
MCHC RBC AUTO-ENTMCNC: 30.2 G/DL (ref 31.4–35)
MCV RBC AUTO: 78.5 FL (ref 82–102)
MONOCYTES # BLD: 0.54 K/UL (ref 0.1–1.3)
MONOCYTES NFR BLD: 8.4 % (ref 4–12)
NEUTS SEG # BLD: 3.02 K/UL (ref 1.7–8.2)
NEUTS SEG NFR BLD: 46.8 % (ref 43–78)
NRBC # BLD: 0 K/UL (ref 0–0.2)
PLATELET # BLD AUTO: 388 K/UL (ref 150–450)
PMV BLD AUTO: 10 FL (ref 9.4–12.3)
POTASSIUM SERPL-SCNC: 4.7 MMOL/L (ref 3.5–5.1)
PROT SERPL-MCNC: 6.4 G/DL (ref 6.3–8.2)
RBC # BLD AUTO: 5.35 M/UL (ref 4.05–5.2)
SODIUM SERPL-SCNC: 137 MMOL/L (ref 136–145)
TRIGL SERPL-MCNC: 96 MG/DL (ref 0–150)
TSH, 3RD GENERATION: 1.25 UIU/ML (ref 0.27–4.2)
VLDLC SERPL CALC-MCNC: 19 MG/DL (ref 6–23)
WBC # BLD AUTO: 6.4 K/UL (ref 4.3–11.1)

## 2025-04-25 NOTE — TELEPHONE ENCOUNTER
Patient came into office today for labs and requested when labs are resulted to please fax them to the bariatric clinic at [phone]

## 2025-04-29 ENCOUNTER — HOSPITAL ENCOUNTER (OUTPATIENT)
Dept: PHYSICAL THERAPY | Age: 25
Setting detail: RECURRING SERIES
Discharge: HOME | End: 2025-05-02
Payer: MEDICAID

## 2025-04-29 PROCEDURE — 97110 THERAPEUTIC EXERCISES: CPT

## 2025-04-29 NOTE — PROGRESS NOTES
Angeli Gomez  : 2000  [place] [employer] [place]  Secondary:  [facility] @ [place]  [place] [address]  Phone: [phone]  Fax: [phone] Plan Frequency: 1-2x/week for 8 weeks    Plan of Care/Certification Expiration Date: 25        Plan of Care/Certification Expiration Date:  Plan of Care/Certification Expiration Date: 25    Frequency/Duration:   Plan Frequency: 1-2x/week for 8 weeks      Time In/Out:   Time In: 1150  Time Out: 1229      PT Visit Info:         Visit Count:  14    OUTPATIENT PHYSICAL THERAPY:   Treatment Note and Progress Note  2025       Episode  (Lumbar pain)               Treatment Diagnosis:    Other low back pain  Degeneration of intervertebral disc of lumbar region with discogenic back pain  Medical/Referring Diagnosis:    Low back pain, unspecified back pain laterality, unspecified chronicity, unspecified whether sciati*  Degeneration of intervertebral disc of lumbar region with discogenic back pain  Class 3 severe obesity due to excess calories in adult, unspecified BMI, unspecified whether seriou*    Referring Physician:  Nubia Coto PA-C MD Orders:  PT Eval and Treat   Return MD Appt:  TBD   Date of Onset:  insidious onset 1 year ago  Allergies:   Adhesive tape  Restrictions/Precautions:   None      Interventions Planned (Treatment may consist of any combination of the following):     See Assessment Note    Subjective Comments:   Patient states her back is feeling better than last visit but her knee is still hurting.   Initial Pain Level:   \"my R knee is hurting, back is feeling pretty good\"  Post Session Pain Level:       no increase/10  Medications Last Reviewed: 2025  Updated Objective Findings:     25   lumbar flexion 50 deg, extension 15 deg and B lateral flexion 20 deg pain free range has improved. B LE strength 5/5 except: R knee ext 4/5, R knee flex 4+/5 R hip flex

## 2025-04-30 ENCOUNTER — HOSPITAL ENCOUNTER (OUTPATIENT)
Dept: PHYSICAL THERAPY | Age: 25
Setting detail: RECURRING SERIES
Discharge: HOME | End: 2025-05-03
Payer: MEDICAID

## 2025-04-30 PROCEDURE — 97110 THERAPEUTIC EXERCISES: CPT

## 2025-04-30 NOTE — PROGRESS NOTES
x 15 with     Seated hamstring curls 2x15 green Seated hip ADD 2 x 15 with     Seated hamstring curls 2x15 green Seated hip ADD 2 x 15 with     Seated hamstring curls 2x15 green Seated hip ADD 2 x 15 with bolster     Seated hamstring curls 2x15 green Seated hip ADD 2 x 15 with bolster     Seated hamstring curls 2x15 green Seated B LAQ 3x10 each    Seated hamstring stretch with strap 30 sec hold x 4 B    Seated hamstring curls 3x10 green B ER with scap retract  Green 2 x 15 B ER with scap retract  Green 2 x 15 B ER with scap retract  Green 2 x 15    Paloff Press double red  2 x 15 B ER with scap retract  Green 2 x 15    Paloff Press double red  2 x 15 B ER with scap retract  Green 2 x 15 B ER with scapula retraction green 3x10    Seated shoulder horizontal abduction 3x10 green Seated shoulder horizontal abduction 2x15 green Seated shoulder horizontal abduction 2x15 green Seated shoulder horizontal abduction 2x15 green Seated shoulder horizontal abduction 2x15 green Seated shoulder horizontal abduction 2x15 green       Seated red ball roll outs  2 x 15 Seated red ball roll outs  2 x 15 Seated red ball roll outs  2 x 15 Seated red ball roll outs  2 x 15    Proprioceptive Activities:                                        Manual Therapy:                              Functional Activities:                                                      Goals: (Goals have been discussed and agreed upon with patient.)  Short-Term Functional Goals: Time Frame: 4 weeks  Patient will be independent with HEP. (MET)  Patient will have B LE strength of 5/5 to stand and walk with less pain. (ONGOING)  Patient will be able to stand at least 20 minutes with max 2/10 pain. (MET)  Discharge Goals: Time Frame: 8 weeks  Patient will be able to lay on her back for 30 minutes without pain. (NOT MET)  Patient will have mod. Oswestry score of 20 or less indicating improved function. (MET)  Patient will have lumbar flexion of 60 degrees to be able to

## 2025-05-05 ENCOUNTER — TELEMEDICINE (OUTPATIENT)
Dept: SURGERY | Age: 25
End: 2025-05-05

## 2025-05-05 DIAGNOSIS — Z71.3 NUTRITIONAL COUNSELING: Primary | ICD-10-CM

## 2025-05-05 NOTE — PROGRESS NOTES
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   Restaurant/Fast Food Intake: Daily   Typical Beverage Consumption: Water with flavoring   Food Allergies: Kiwi, spinach - throat swell up; pineapple - throat itching    Cultural Preferences: None   Diet Recall:   Breakfast: Bagel - sausage, egg, and cheese  Lunch: None   Dinner: Macaroni and s'mores pretzels     Lifestyle Assessment:    Hours of sleep/night: ~4-6 hours    Current Occupation: None    Number of people living in house and influence: 4 people including pt, , and two kids     Exercise Assessment:   PAR-Q: Limitations from PT (lower back) - current   Medical:     Pain or injuries (present): Lower back, left knee pain, and feet swelling    Past surgeries related to mobility: None   Exercise equipment at home: None    Gym Membership: None - but plans to get one when clearance is given from PT   Current Exercise Routine: PT 2 x weekly   Assessment Exercise Goal: Continue current exercise and increase as able    DIAGNOSIS:  Class III obesity R/T hx of yoyo dieting and excessive energy intake as evidenced by BMI = 57.32 and 279 % IBW.     Intervention:   Evaluated diet recall and identified modifications.  Educated pt on mindful eating techniques and macronutrients.  Encouraged continued and increased activity.          Monitoring and Evaluation:  Monitor for continued safe, supervised weight loss for OM   F/U per MD Sakina Peter MBA. RD, LD

## 2025-05-06 ENCOUNTER — HOSPITAL ENCOUNTER (OUTPATIENT)
Dept: PHYSICAL THERAPY | Age: 25
Setting detail: RECURRING SERIES
Discharge: HOME | End: 2025-05-09
Payer: MEDICAID

## 2025-05-06 PROCEDURE — 97110 THERAPEUTIC EXERCISES: CPT

## 2025-05-06 NOTE — PROGRESS NOTES
Angeli Gomez  : 2000  [place] [employer] [place]  Secondary:  [facility] @ [place] [address]  Phone: [phone]  Fax: [phone] Plan Frequency: 1-2x/week for 8 weeks    Plan of Care/Certification Expiration Date: 25        Plan of Care/Certification Expiration Date:  Plan of Care/Certification Expiration Date: 25    Frequency/Duration:   Plan Frequency: 1-2x/week for 8 weeks      Time In/Out:   Time In: 1153  Time Out: 1223      PT Visit Info:         Visit Count:  16    OUTPATIENT PHYSICAL THERAPY:   Treatment Note   2025       Episode  (Lumbar pain)               Treatment Diagnosis:    Other low back pain  Degeneration of intervertebral disc of lumbar region with discogenic back pain  Medical/Referring Diagnosis:    Low back pain, unspecified back pain laterality, unspecified chronicity, unspecified whether sciati*  Degeneration of intervertebral disc of lumbar region with discogenic back pain  Class 3 severe obesity due to excess calories in adult, unspecified BMI, unspecified whether seriou*    Referring Physician:  Nubia Coto PA-C MD Orders:  PT Eval and Treat   Return MD Appt:  TBD   Date of Onset:  insidious onset 1 year ago  Allergies:   Adhesive tape  Restrictions/Precautions:   None      Interventions Planned (Treatment may consist of any combination of the following):     See Assessment Note    Subjective Comments:   Patient reports that her back has had increased soreness due to adjusting walking and standing posture due to R knee pain and left ankle pain/swelling.    Initial Pain Level:   \"back is sore, have had left ankle and foot swelling/pain\"  Post Session Pain Level:       no increase/10  Medications Last Reviewed: 2025  Updated Objective Findings:     25   lumbar flexion 50 deg, extension 15 deg and B lateral flexion 20 deg pain free range has improved. B LE strength  except:

## 2025-05-06 NOTE — PROGRESS NOTES
Maida Cheatham MD                 Comprehensive Medical & Surgical Weight Loss       Date of visit: 5/7/2025      History and Physical    Patient: Angeli Gomez MRN: [id number]     YOB: 2000  Age: 25 y.o.  Sex: female        Angeli Gomez  who presents to the [facility] for follow up after initial consultation to assist in weight loss.  We will review blood work results, medication options and discuss further our multi-disciplinary weight loss program.      She presents with a height of 1.6 m (5' 3\") and weight of (!) 146.1 kg (322 lb), giving her a Body mass index is 57.04 kg/m²..      Last Non-Surgical Weight Loss:      5/7/2025     9:27 AM 4/21/2025     1:55 PM   Non-Surgical Weight Loss Tracker   Consult Date  4/21/2025   Initial Height  5' 3\"   Initial Weight  321 lbs   Ideal Body Weight  141 lb   Initial BMI  56.9   Initial EBW  180 lb   Is patient taking anti-obesity Meds?  No   Date 4/21/2025    Weight 322 lb    Weight Change since last Visit 1 lb    Weight Change since Initial Consult 1 lb    % EBWL -1%    Non-Surgical Subsequent Eval % Body Fat  57%        Initial Neck Circumference 16.25   Initial Waist Circumference 52   Initial Hip Circumference 60   Initial BF% 57       Lowest weight 200 lbs  Highest weight 330 lbs  Biggest challenge to weight loss - 1-2 meals per day, limited exercise    MEDICAL HISTORY:  Morbid Obesity   Depression  Anxiety  Hx of SI  Back pain  Iron deficiency  Hx of PE  Hx of umbilical hernia repair    Comorbidity Yes or No   Hypertension No   Hyperlipidemia No   Diabetes Mellitus  Insulin dependent = No  Last A1c = N/A No   Coronary Artery Disease No   Gastroesophageal Reflux No   Obstructive Sleep Apnea No   Cancer No   Asthma No   Osteoarthritis No   Joint Pain Yes      Admits to rare GERD symptoms including (+) heartburn, (-) regurgitation, (-) dysphagia.    Denies current medication regimen.  Denies previous testing including EGD.

## 2025-05-06 NOTE — THERAPY RECERTIFICATION
Angeli Gomez  : 2000  [place] [employer] [place]  Secondary:  [facility] @ [place] [address]  Phone: [phone]  Fax: [phone] Plan Frequency: 1-2x/week for 8 weeks  Plan of Care/Certification Expiration Date: 25        Plan of Care/Certification Expiration Date:  Plan of Care/Certification Expiration Date: 25    Frequency/Duration: Plan Frequency: 1-2x/week for 8 weeks      Time In/Out:   Time In: 1153  Time Out: 1223      PT Visit Info:         Visit Count:  16                OUTPATIENT PHYSICAL THERAPY:             Recertification 2025               Episode (Lumbar pain)         Treatment Diagnosis:     Other low back pain  Degeneration of intervertebral disc of lumbar region with discogenic back pain  Medical/Referring Diagnosis:    Low back pain, unspecified back pain laterality, unspecified chronicity, unspecified whether sciati*  Degeneration of intervertebral disc of lumbar region with discogenic back pain  Class 3 severe obesity due to excess calories in adult, unspecified BMI, unspecified whether seriou*  Referring Physician:  Nubia Coto PA-C MD Orders:  PT Eval and Treat   Return MD Appt:  TBD  Date of Onset:    insidious onset 1 year ago.   Allergies:  Adhesive tape  Restrictions/Precautions:    None      Medications Last Reviewed: 2025     SUBJECTIVE   History of Injury/Illness (Reason for Referral):  Patient reports insidious onset of lumbar pain about a year ago. Patient had 2 pregnancies, deliveries and epidurals with last delivery 17 months ago. Patient reports that about 3 months ago she had an episode of severe pain and was unable to move. Patient states that went to the chiropractor for a month that helped a little but still has elevated pain this is affecting mobility and ability to complete daily activities. Patient states that lying on her back on the floor, pressure against

## 2025-05-07 ENCOUNTER — OFFICE VISIT (OUTPATIENT)
Dept: SURGERY | Age: 25
End: 2025-05-07
Payer: MEDICAID

## 2025-05-07 ENCOUNTER — APPOINTMENT (OUTPATIENT)
Dept: PHYSICAL THERAPY | Age: 25
End: 2025-05-07
Payer: MEDICAID

## 2025-05-07 VITALS
SYSTOLIC BLOOD PRESSURE: 124 MMHG | WEIGHT: 293 LBS | DIASTOLIC BLOOD PRESSURE: 82 MMHG | HEIGHT: 63 IN | HEART RATE: 90 BPM | BODY MASS INDEX: 51.91 KG/M2

## 2025-05-07 DIAGNOSIS — E65 ABDOMINAL OBESITY: ICD-10-CM

## 2025-05-07 DIAGNOSIS — R73.01 IMPAIRED FASTING GLUCOSE: Primary | ICD-10-CM

## 2025-05-07 DIAGNOSIS — E66.813 CLASS 3 SEVERE OBESITY DUE TO EXCESS CALORIES WITH BODY MASS INDEX (BMI) OF 50.0 TO 59.9 IN ADULT (HCC): ICD-10-CM

## 2025-05-07 DIAGNOSIS — Z71.3 NUTRITIONAL COUNSELING: ICD-10-CM

## 2025-05-07 DIAGNOSIS — Z71.82 EXERCISE COUNSELING: ICD-10-CM

## 2025-05-07 PROCEDURE — 99215 OFFICE O/P EST HI 40 MIN: CPT | Performed by: SURGERY

## 2025-05-09 ENCOUNTER — APPOINTMENT (OUTPATIENT)
Dept: PHYSICAL THERAPY | Age: 25
End: 2025-05-09
Payer: MEDICAID

## 2025-05-13 ENCOUNTER — HOSPITAL ENCOUNTER (OUTPATIENT)
Dept: PHYSICAL THERAPY | Age: 25
Setting detail: RECURRING SERIES
Discharge: HOME | End: 2025-05-16
Payer: MEDICAID

## 2025-05-13 PROCEDURE — 97110 THERAPEUTIC EXERCISES: CPT

## 2025-05-13 NOTE — PROGRESS NOTES
Angeli Gomez  : 2000  [place]: [employer] [place])  Secondary:  [facility] @ [place]  [place] [address]  Phone: [phone]  Fax: [phone] Plan Frequency: 1-2x/week for 8 weeks    Plan of Care/Certification Expiration Date: 25        Plan of Care/Certification Expiration Date:  Plan of Care/Certification Expiration Date: 25    Frequency/Duration:   Plan Frequency: 1-2x/week for 8 weeks      Time In/Out:   Time In: 1155  Time Out: 1224      PT Visit Info:         Visit Count:  17    OUTPATIENT PHYSICAL THERAPY:   Treatment Note   2025       Episode  (Lumbar pain)               Treatment Diagnosis:    Other low back pain  Degeneration of intervertebral disc of lumbar region with discogenic back pain  Medical/Referring Diagnosis:    Low back pain, unspecified back pain laterality, unspecified chronicity, unspecified whether sciati*  Degeneration of intervertebral disc of lumbar region with discogenic back pain  Class 3 severe obesity due to excess calories in adult, unspecified BMI, unspecified whether seriou*    Referring Physician:  Nubia Coto PA-C MD Orders:  PT Eval and Treat   Return MD Appt:  TBD   Date of Onset:  insidious onset 1 year ago  Allergies:   Adhesive tape  Restrictions/Precautions:   None      Interventions Planned (Treatment may consist of any combination of the following):     See Assessment Note    Subjective Comments:   Patient reports that her back is sore today. Patient states that she had increased back pain when sweeping her living room last Thursday and had to sit down due to the pain.    Initial Pain Level:   \"moderate back pain\"  Post Session Pain Level:       no increase/10  Medications Last Reviewed: 2025  Updated Objective Findings:     25   lumbar flexion 50 deg, extension 15 deg and B lateral flexion 20 deg pain free range has improved. B LE strength  except: R knee ext

## 2025-05-14 ENCOUNTER — APPOINTMENT (OUTPATIENT)
Dept: PHYSICAL THERAPY | Age: 25
End: 2025-05-14
Payer: MEDICAID

## 2025-05-15 ENCOUNTER — RESULTS FOLLOW-UP (OUTPATIENT)
Dept: FAMILY MEDICINE CLINIC | Facility: CLINIC | Age: 25
End: 2025-05-15

## 2025-05-20 ENCOUNTER — HOSPITAL ENCOUNTER (OUTPATIENT)
Dept: PHYSICAL THERAPY | Age: 25
Setting detail: RECURRING SERIES
Discharge: HOME | End: 2025-05-23
Payer: MEDICAID

## 2025-05-20 PROCEDURE — 97110 THERAPEUTIC EXERCISES: CPT

## 2025-05-20 NOTE — PROGRESS NOTES
Angeli Gomez  : 2000  [place] [employer] [place])  Secondary:  [facility] @ [place]  [place] [address]  Phone: [phone]  Fax: [phone] Plan Frequency: 1-2x/week for 8 weeks    Plan of Care/Certification Expiration Date: 25        Plan of Care/Certification Expiration Date:  Plan of Care/Certification Expiration Date: 25    Frequency/Duration:   Plan Frequency: 1-2x/week for 8 weeks      Time In/Out:   Time In: 1159  Time Out: 1226      PT Visit Info:         Visit Count:  18    OUTPATIENT PHYSICAL THERAPY:   Treatment Note   2025       Episode  (Lumbar pain)               Treatment Diagnosis:    Other low back pain  Degeneration of intervertebral disc of lumbar region with discogenic back pain  Medical/Referring Diagnosis:    Low back pain, unspecified back pain laterality, unspecified chronicity, unspecified whether sciati*  Degeneration of intervertebral disc of lumbar region with discogenic back pain  Class 3 severe obesity due to excess calories in adult, unspecified BMI, unspecified whether seriou*    Referring Physician:  Nubia Coto PA-C MD Orders:  PT Eval and Treat   Return MD Appt:  TBD   Date of Onset:  insidious onset 1 year ago  Allergies:   Adhesive tape  Restrictions/Precautions:   None      Interventions Planned (Treatment may consist of any combination of the following):     See Assessment Note    Subjective Comments:   Patient reports she has had a lot of stressful things going on and have not had time to pay attention to back and knee pain.  Initial Pain Level:   \"moderate back pain\"  Post Session Pain Level:       no increase/10  Medications Last Reviewed: 2025  Updated Objective Findings:     25   lumbar flexion 50 deg, extension 15 deg and B lateral flexion 20 deg pain free range has improved. B LE strength 5/5 except: R knee ext 4/5, R knee flex 4+/5 R hip flex 4+/5.  Treatment

## 2025-05-21 ENCOUNTER — APPOINTMENT (OUTPATIENT)
Dept: PHYSICAL THERAPY | Age: 25
End: 2025-05-21
Payer: MEDICAID

## 2025-05-27 ENCOUNTER — HOSPITAL ENCOUNTER (OUTPATIENT)
Dept: PHYSICAL THERAPY | Age: 25
Setting detail: RECURRING SERIES
Discharge: HOME | End: 2025-05-30
Payer: MEDICAID

## 2025-05-27 PROCEDURE — 97110 THERAPEUTIC EXERCISES: CPT

## 2025-05-27 NOTE — PROGRESS NOTES
Seated hamstring curls 2x15 green Seated hip ADD 2 x 15 with bolster     Seated hamstring curls 2x15 green Seated hip ADD 2 x 15 with bolster     Seated hamstring curls 2x15 black Seated hip ADD 2 x 15 with bolster     Seated hamstring curls 2x15 black    B ER with scapula retraction green 3x10 B ER with scap retract  Green 2 x 15 B ER with scap retract  Green 2 x 15 B ER with scap retract  Black 2 x 15 B ER with scap retract  Black 2 x 15     Seated shoulder horizontal abduction 2x15 green Seated shoulder horizontal abduction 2x15 green Seated shoulder horizontal abduction 2x15 Black Seated shoulder horizontal abduction 2x15 Black           Proprioceptive Activities:                                Manual Therapy:                        Functional Activities:                                            Goals: (Goals have been discussed and agreed upon with patient.)  Short-Term Functional Goals: Time Frame: 4 weeks  Patient will be independent with HEP. (MET)  Patient will have B LE strength of 5/5 to stand and walk with less pain. (ONGOING)  Patient will be able to stand at least 20 minutes with max 2/10 pain. (MET)  Discharge Goals: Time Frame: 8 weeks  Patient will be able to lay on her back for 30 minutes without pain. (NOT MET)  Patient will have mod. Oswestry score of 20 or less indicating improved function. (MET)  Patient will have lumbar flexion of 60 degrees to be able to  items from the floor. (PROGRESSING)       Outcome Measure:   Tool Used: Modified Oswestry Low Back Pain Questionnaire  Score:  Initial: 30/50  Most Recent: 18/50 (Date: 4-29-25 )   Interpretation of Score: Each section is scored on a 0-5 scale, 5 representing the greatest disability.  The scores of each section are added together for a total score of 50.    Treatment/Session Summary:    Treatment Assessment:   Patient able to complete standing rows and shoulder extension today without increased pain. Patient has completed

## 2025-05-29 ENCOUNTER — APPOINTMENT (OUTPATIENT)
Dept: PHYSICAL THERAPY | Age: 25
End: 2025-05-29
Payer: MEDICAID

## 2025-05-30 ENCOUNTER — APPOINTMENT (OUTPATIENT)
Dept: PHYSICAL THERAPY | Age: 25
End: 2025-05-30
Payer: MEDICAID

## 2025-06-03 ENCOUNTER — APPOINTMENT (OUTPATIENT)
Dept: PHYSICAL THERAPY | Age: 25
End: 2025-06-03
Payer: MEDICAID

## 2025-06-06 ENCOUNTER — HOSPITAL ENCOUNTER (OUTPATIENT)
Dept: PHYSICAL THERAPY | Age: 25
Setting detail: RECURRING SERIES
Discharge: HOME | End: 2025-06-09
Payer: MEDICAID

## 2025-06-06 PROCEDURE — 97110 THERAPEUTIC EXERCISES: CPT

## 2025-06-06 NOTE — PROGRESS NOTES
Angeli Gomez  : 2000  [place]: [employer] [place]  Secondary:  [facility] @ [place]  [place] [address]  Phone: [phone]  Fax: [phone] Plan Frequency: 1-2x/week for 8 weeks    Plan of Care/Certification Expiration Date: 25        Plan of Care/Certification Expiration Date:  Plan of Care/Certification Expiration Date: 25    Frequency/Duration:   Plan Frequency: 1-2x/week for 8 weeks      Time In/Out:   Time In: 1232  Time Out: 1312      PT Visit Info:         Visit Count:  20    OUTPATIENT PHYSICAL THERAPY:   Treatment Note and Progress Note   2025       Episode  (Lumbar pain)               Treatment Diagnosis:    Other low back pain  Degeneration of intervertebral disc of lumbar region with discogenic back pain  Medical/Referring Diagnosis:    Low back pain, unspecified back pain laterality, unspecified chronicity, unspecified whether sciati*  Degeneration of intervertebral disc of lumbar region with discogenic back pain  Class 3 severe obesity due to excess calories in adult, unspecified BMI, unspecified whether seriou*    Referring Physician:  Nubia Coto PA-C MD Orders:  PT Eval and Treat   Return MD Appt:  TBD   Date of Onset:  insidious onset 1 year ago  Allergies:   Adhesive tape  Restrictions/Precautions:   None      Interventions Planned (Treatment may consist of any combination of the following):     See Assessment Note    Subjective Comments:   Patient reports she has not slept well the past few nights. Patient states her R knee is bothering her more than anything with 8/10 pain.    Pre session pain level:  8/10  Post Session Pain Level:       no increase/10  Medications Last Reviewed: 2025  Updated Objective Findings:     25   lumbar flexion 65 deg, extension 25 deg and B lateral flexion 35 deg pain free range has improved. B LE strength 5/5 except: R knee ext 4+/5..  Treatment   TREATMENT:

## 2025-06-12 ENCOUNTER — OFFICE VISIT (OUTPATIENT)
Dept: SURGERY | Age: 25
End: 2025-06-12
Payer: MEDICAID

## 2025-06-12 VITALS
BODY MASS INDEX: 51.91 KG/M2 | WEIGHT: 293 LBS | DIASTOLIC BLOOD PRESSURE: 78 MMHG | SYSTOLIC BLOOD PRESSURE: 137 MMHG | HEART RATE: 84 BPM | HEIGHT: 63 IN

## 2025-06-12 DIAGNOSIS — Z71.82 EXERCISE COUNSELING: ICD-10-CM

## 2025-06-12 DIAGNOSIS — Z71.3 NUTRITIONAL COUNSELING: ICD-10-CM

## 2025-06-12 DIAGNOSIS — F33.9 RECURRENT MAJOR DEPRESSIVE DISORDER, REMISSION STATUS UNSPECIFIED: ICD-10-CM

## 2025-06-12 DIAGNOSIS — E65 ABDOMINAL OBESITY: ICD-10-CM

## 2025-06-12 DIAGNOSIS — R73.01 IMPAIRED FASTING GLUCOSE: Primary | ICD-10-CM

## 2025-06-12 DIAGNOSIS — E66.813 CLASS 3 SEVERE OBESITY DUE TO EXCESS CALORIES WITH BODY MASS INDEX (BMI) OF 50.0 TO 59.9 IN ADULT (HCC): ICD-10-CM

## 2025-06-12 PROCEDURE — 99215 OFFICE O/P EST HI 40 MIN: CPT | Performed by: SURGERY

## 2025-06-12 RX ORDER — BUPROPION HYDROCHLORIDE 150 MG/1
150 TABLET ORAL EVERY MORNING
Qty: 30 TABLET | Refills: 0 | Status: SHIPPED | OUTPATIENT
Start: 2025-06-12

## 2025-06-12 RX ORDER — SEMAGLUTIDE 0.25 MG/.5ML
0.25 INJECTION, SOLUTION SUBCUTANEOUS
Qty: 2 ML | Refills: 0 | Status: SHIPPED | OUTPATIENT
Start: 2025-06-12 | End: 2025-07-12

## 2025-06-12 NOTE — PROGRESS NOTES
OBESITY MEDICINE NUTRITION REASSESSMENT     Assessment:    Pt reports working on dietary compliance since last office visit. Pt is eating at least 3x/d. Pt is drinking water. Pt is exercising via walking . Pt reports diarrhea  Diet Recall:              Breakfast: Protein drink and ham/cheese bite              Lunch: Watermelon and ham/cheese roll up              Dinner: Chx nuggets - air fried      Intervention:   Evaluated diet recall   Encouraged continued and increased activity.          Monitoring and Evaluation:  Monitor for continued safe, supervised weight loss for OM.    F/U per MD Sakina Peter MBA. RD, LD    
follow up.     Nutrition Recommendations: please see dietitian notes.     Exercise Recommendations: Exercise routine, incorporating both cardio and strength training, building up to 5x/wk for at least 300 minutes a week.    Behavior Recommendations: increase weight training exercises        Scripts: bupropion 150 mg daily, wegovy 0.25 mg     FU 1 months - RD + MD      Time: I spent 40 minutes preparing to see patient (including chart review and preparation), obtaining and/or reviewing additional medical history, performing a physical exam and evaluation, documenting clinical information in the electronic health record, independently interpreting results, communicating results to patient, family or caregiver, and/or coordinating care.        Signed: Maida Cheatham MD  Bariatric & Minimally Invasive Surgery  6/12/2025

## 2025-06-17 ENCOUNTER — APPOINTMENT (OUTPATIENT)
Dept: PHYSICAL THERAPY | Age: 25
End: 2025-06-17
Payer: MEDICAID

## 2025-06-20 ENCOUNTER — HOSPITAL ENCOUNTER (OUTPATIENT)
Dept: PHYSICAL THERAPY | Age: 25
Setting detail: RECURRING SERIES
End: 2025-06-20
Payer: MEDICAID

## 2025-06-24 ENCOUNTER — HOSPITAL ENCOUNTER (OUTPATIENT)
Dept: PHYSICAL THERAPY | Age: 25
Setting detail: RECURRING SERIES
Discharge: HOME | End: 2025-06-27
Payer: MEDICAID

## 2025-06-24 PROCEDURE — 97110 THERAPEUTIC EXERCISES: CPT

## 2025-07-01 ENCOUNTER — HOSPITAL ENCOUNTER (OUTPATIENT)
Dept: PHYSICAL THERAPY | Age: 25
Setting detail: RECURRING SERIES
End: 2025-07-01
Payer: MEDICAID

## 2025-07-07 ENCOUNTER — HOSPITAL ENCOUNTER (OUTPATIENT)
Dept: PHYSICAL THERAPY | Age: 25
Setting detail: RECURRING SERIES
Discharge: HOME | End: 2025-07-10
Payer: MEDICAID

## 2025-07-07 PROCEDURE — 97110 THERAPEUTIC EXERCISES: CPT

## 2025-07-14 ENCOUNTER — APPOINTMENT (OUTPATIENT)
Dept: PHYSICAL THERAPY | Age: 25
End: 2025-07-14
Payer: MEDICAID

## 2025-07-15 ENCOUNTER — OFFICE VISIT (OUTPATIENT)
Dept: SURGERY | Age: 25
End: 2025-07-15

## 2025-07-15 DIAGNOSIS — Z91.199 NO-SHOW FOR APPOINTMENT: Primary | ICD-10-CM

## 2025-07-18 ENCOUNTER — APPOINTMENT (OUTPATIENT)
Dept: PHYSICAL THERAPY | Age: 25
End: 2025-07-18
Payer: MEDICAID

## 2025-07-21 ENCOUNTER — HOSPITAL ENCOUNTER (OUTPATIENT)
Dept: PHYSICAL THERAPY | Age: 25
Setting detail: RECURRING SERIES
End: 2025-07-21
Payer: MEDICAID

## 2025-07-23 ENCOUNTER — APPOINTMENT (OUTPATIENT)
Dept: PHYSICAL THERAPY | Age: 25
End: 2025-07-23
Payer: MEDICAID

## 2025-07-28 ENCOUNTER — HOSPITAL ENCOUNTER (OUTPATIENT)
Dept: PHYSICAL THERAPY | Age: 25
Setting detail: RECURRING SERIES
End: 2025-07-28
Payer: MEDICAID

## 2025-07-30 ENCOUNTER — HOSPITAL ENCOUNTER (OUTPATIENT)
Dept: PHYSICAL THERAPY | Age: 25
Setting detail: RECURRING SERIES
End: 2025-07-30
Payer: MEDICAID